# Patient Record
Sex: FEMALE | Race: WHITE | NOT HISPANIC OR LATINO | Employment: UNEMPLOYED | URBAN - METROPOLITAN AREA
[De-identification: names, ages, dates, MRNs, and addresses within clinical notes are randomized per-mention and may not be internally consistent; named-entity substitution may affect disease eponyms.]

---

## 2023-10-14 ENCOUNTER — APPOINTMENT (OUTPATIENT)
Dept: CT IMAGING | Facility: HOSPITAL | Age: 23
End: 2023-10-14
Payer: COMMERCIAL

## 2023-10-14 ENCOUNTER — HOSPITAL ENCOUNTER (EMERGENCY)
Facility: HOSPITAL | Age: 23
Discharge: HOME | End: 2023-10-14
Attending: EMERGENCY MEDICINE | Admitting: EMERGENCY MEDICINE
Payer: COMMERCIAL

## 2023-10-14 VITALS
SYSTOLIC BLOOD PRESSURE: 92 MMHG | HEIGHT: 64 IN | OXYGEN SATURATION: 99 % | WEIGHT: 130 LBS | HEART RATE: 62 BPM | BODY MASS INDEX: 22.2 KG/M2 | DIASTOLIC BLOOD PRESSURE: 51 MMHG | TEMPERATURE: 98.5 F | RESPIRATION RATE: 18 BRPM

## 2023-10-14 DIAGNOSIS — K52.9 GASTROENTERITIS: ICD-10-CM

## 2023-10-14 LAB
ALBUMIN SERPL BCG-MCNC: 4.7 G/DL (ref 3.5–5.2)
ALBUMIN UR-MCNC: 70 MG/DL
ALP SERPL-CCNC: 67 U/L (ref 35–104)
ALT SERPL W P-5'-P-CCNC: 11 U/L (ref 0–50)
ANION GAP SERPL CALCULATED.3IONS-SCNC: 11 MMOL/L (ref 7–15)
APPEARANCE UR: ABNORMAL
AST SERPL W P-5'-P-CCNC: 17 U/L (ref 0–45)
BASO+EOS+MONOS # BLD AUTO: NORMAL 10*3/UL
BASO+EOS+MONOS NFR BLD AUTO: NORMAL %
BASOPHILS # BLD AUTO: 0 10E3/UL (ref 0–0.2)
BASOPHILS NFR BLD AUTO: 1 %
BILIRUB DIRECT SERPL-MCNC: <0.2 MG/DL (ref 0–0.3)
BILIRUB SERPL-MCNC: 0.6 MG/DL
BILIRUB UR QL STRIP: NEGATIVE
BUN SERPL-MCNC: 8.1 MG/DL (ref 6–20)
CALCIUM SERPL-MCNC: 9.1 MG/DL (ref 8.6–10)
CHLORIDE SERPL-SCNC: 104 MMOL/L (ref 98–107)
COLOR UR AUTO: YELLOW
CREAT SERPL-MCNC: 0.72 MG/DL (ref 0.51–0.95)
DEPRECATED HCO3 PLAS-SCNC: 24 MMOL/L (ref 22–29)
EGFRCR SERPLBLD CKD-EPI 2021: >90 ML/MIN/1.73M2
EOSINOPHIL # BLD AUTO: 0.1 10E3/UL (ref 0–0.7)
EOSINOPHIL NFR BLD AUTO: 1 %
ERYTHROCYTE [DISTWIDTH] IN BLOOD BY AUTOMATED COUNT: 13 % (ref 10–15)
GLUCOSE SERPL-MCNC: 91 MG/DL (ref 70–99)
GLUCOSE UR STRIP-MCNC: NEGATIVE MG/DL
HCG UR QL: NEGATIVE
HCT VFR BLD AUTO: 39.8 % (ref 35–47)
HGB BLD-MCNC: 12.8 G/DL (ref 11.7–15.7)
HGB UR QL STRIP: ABNORMAL
IMM GRANULOCYTES # BLD: 0 10E3/UL
IMM GRANULOCYTES NFR BLD: 0 %
KETONES UR STRIP-MCNC: 20 MG/DL
LEUKOCYTE ESTERASE UR QL STRIP: NEGATIVE
LIPASE SERPL-CCNC: 42 U/L (ref 13–60)
LYMPHOCYTES # BLD AUTO: 1.4 10E3/UL (ref 0.8–5.3)
LYMPHOCYTES NFR BLD AUTO: 30 %
MCH RBC QN AUTO: 27.8 PG (ref 26.5–33)
MCHC RBC AUTO-ENTMCNC: 32.2 G/DL (ref 31.5–36.5)
MCV RBC AUTO: 87 FL (ref 78–100)
MONOCYTES # BLD AUTO: 0.4 10E3/UL (ref 0–1.3)
MONOCYTES NFR BLD AUTO: 7 %
MUCOUS THREADS #/AREA URNS LPF: PRESENT /LPF
NEUTROPHILS # BLD AUTO: 3 10E3/UL (ref 1.6–8.3)
NEUTROPHILS NFR BLD AUTO: 61 %
NITRATE UR QL: NEGATIVE
NRBC # BLD AUTO: 0 10E3/UL
NRBC BLD AUTO-RTO: 0 /100
PH UR STRIP: 6 [PH] (ref 5–7)
PLATELET # BLD AUTO: 219 10E3/UL (ref 150–450)
POTASSIUM SERPL-SCNC: 3.5 MMOL/L (ref 3.4–5.3)
PROT SERPL-MCNC: 7.4 G/DL (ref 6.4–8.3)
RBC # BLD AUTO: 4.6 10E6/UL (ref 3.8–5.2)
RBC URINE: 1 /HPF
SODIUM SERPL-SCNC: 139 MMOL/L (ref 135–145)
SP GR UR STRIP: 1.03 (ref 1–1.03)
SQUAMOUS EPITHELIAL: 5 /HPF
UROBILINOGEN UR STRIP-MCNC: 2 MG/DL
WBC # BLD AUTO: 4.9 10E3/UL (ref 4–11)
WBC URINE: 4 /HPF

## 2023-10-14 PROCEDURE — 99285 EMERGENCY DEPT VISIT HI MDM: CPT | Mod: 25

## 2023-10-14 PROCEDURE — 258N000003 HC RX IP 258 OP 636: Performed by: STUDENT IN AN ORGANIZED HEALTH CARE EDUCATION/TRAINING PROGRAM

## 2023-10-14 PROCEDURE — 82248 BILIRUBIN DIRECT: CPT

## 2023-10-14 PROCEDURE — 81001 URINALYSIS AUTO W/SCOPE: CPT

## 2023-10-14 PROCEDURE — 82310 ASSAY OF CALCIUM: CPT

## 2023-10-14 PROCEDURE — 83690 ASSAY OF LIPASE: CPT

## 2023-10-14 PROCEDURE — 250N000011 HC RX IP 250 OP 636: Mod: JZ

## 2023-10-14 PROCEDURE — 96361 HYDRATE IV INFUSION ADD-ON: CPT

## 2023-10-14 PROCEDURE — 96374 THER/PROPH/DIAG INJ IV PUSH: CPT | Mod: 59

## 2023-10-14 PROCEDURE — 85025 COMPLETE CBC W/AUTO DIFF WBC: CPT

## 2023-10-14 PROCEDURE — 36415 COLL VENOUS BLD VENIPUNCTURE: CPT

## 2023-10-14 PROCEDURE — 74177 CT ABD & PELVIS W/CONTRAST: CPT

## 2023-10-14 PROCEDURE — 81025 URINE PREGNANCY TEST: CPT

## 2023-10-14 RX ORDER — IOPAMIDOL 755 MG/ML
75 INJECTION, SOLUTION INTRAVASCULAR ONCE
Status: COMPLETED | OUTPATIENT
Start: 2023-10-14 | End: 2023-10-14

## 2023-10-14 RX ORDER — METOCLOPRAMIDE 10 MG/1
10 TABLET ORAL
Qty: 40 TABLET | Refills: 0 | Status: SHIPPED | OUTPATIENT
Start: 2023-10-14 | End: 2023-10-24

## 2023-10-14 RX ORDER — METOCLOPRAMIDE HYDROCHLORIDE 5 MG/ML
5 INJECTION INTRAMUSCULAR; INTRAVENOUS ONCE
Status: COMPLETED | OUTPATIENT
Start: 2023-10-14 | End: 2023-10-14

## 2023-10-14 RX ADMIN — SODIUM CHLORIDE 500 ML: 9 INJECTION, SOLUTION INTRAVENOUS at 09:44

## 2023-10-14 RX ADMIN — IOPAMIDOL 75 ML: 755 INJECTION, SOLUTION INTRAVENOUS at 12:48

## 2023-10-14 RX ADMIN — METOCLOPRAMIDE 5 MG: 5 INJECTION, SOLUTION INTRAMUSCULAR; INTRAVENOUS at 09:47

## 2023-10-14 ASSESSMENT — ENCOUNTER SYMPTOMS
VOMITING: 1
DIARRHEA: 1
HEMATURIA: 0
ABDOMINAL PAIN: 1
DYSURIA: 0
BACK PAIN: 0
NAUSEA: 1
SHORTNESS OF BREATH: 1

## 2023-10-14 ASSESSMENT — ACTIVITIES OF DAILY LIVING (ADL)
ADLS_ACUITY_SCORE: 35
ADLS_ACUITY_SCORE: 35

## 2023-10-14 NOTE — ED PROVIDER NOTES
EMERGENCY DEPARTMENT ENCOUNTER      NAME: Samy Leal  AGE: 23 year old female  YOB: 2000  MRN: [id number]  EVALUATION DATE & TIME: 10/14/2023  9:19 AM    PCP: No primary care provider on file.    ED PROVIDER: Shania Dent PA-C      Chief Complaint   Patient presents with    Vomiting     FINAL IMPRESSION:  1. Gastroenteritis      ED COURSE & MEDICAL DECISION MAKING:    Pertinent Labs & Imaging studies reviewed. (See chart for details)  23 year old female presents to the Emergency Department for evaluation of vomiting.  Earlier this morning while at work patient felt nauseous and dizzy.  Patient reports that she vomited 3 times.  She is not currently dizzy.  She denies any blood in the vomit.  She also reports having generalized abdominal pain only with vomiting.  She denies recent marijuana use.  Vital signs reviewed and unremarkable.  Afebrile.  On exam patient is minimally tender to palpation of the abdomen.  No specific region more tender than the other.  No rash.  Exam is otherwise unremarkable.    Differential diagnosis include appendicitis, cholecystitis, COVID, influenza, strep, viral illness, diverticulitis, small bowel obstruction. CBC shows no white blood cell elevation.  Basic was reassuring.  Lipase and hepatic were normal. UA shows no nitrates or leukocytes.  hCG was negative.  CT of the abdomen shows paucity of stool in the colon which contains air or liquid fecal contents.  Suggestive of gastroenteritis.  No signs of colitis or bowel obstruction..  Patient received fluids and Reglan in the emergency room.  Patient was able to sleep and reports that her symptoms did improve while in the emergency room.  Patient did not vomit while in the emergency room.    Educated on her imaging and lab results.  Patient was diagnosed with gastroenteritis.  Patient will return to the ED if new symptoms develop or symptoms worsen.  Patient will follow-up with her primary care doctor to discuss  her ED visit.  Patient return to the ED if new symptoms develop or symptoms worsen.  Patient was prescribed Reglan for home.  All questions answered.  Patient agrees with plan.      ED COURSE:   9:21 AM I saw the patient.   11:50 AM  Patient reports that she had fluids and Reglan which allowed her to sleep.  Patient is resting comfortably in the ED.  Patient has not vomited while in the emergency room.   :49 PM patient was educated on her imaging and lab results.  Patient be discharged home.  All questions answered.    At the conclusion of the encounter I discussed the results of all of the tests and the disposition. The questions were answered. The patient or family acknowledged understanding and was agreeable with the care plan.     0 minutes of critical care time       Additional Documentation    History:  Supplemental history from: Documented in chart, if applicable  External Record(s) reviewed: Documented in chart, if applicable.    Work Up:  Chart documentation includes differential considered and any EKGs or imaging interpreted by provider.  In additional to work up documented, I considered the following work up: Documented in chart, if applicable.    External consultation:  Discussion of management with another provider: Documented in chart, if applicable    Complicating factors:  Care impacted by chronic illness: N/A  Care affected by social determinants of health: N/A    Disposition considerations: Discharge. I prescribed additional prescription strength medication(s) as charted. See documentation for any additional details.      MEDICATIONS GIVEN IN THE EMERGENCY:  Medications   sodium chloride 0.9% BOLUS 500 mL (0 mLs Intravenous Stopped 10/14/23 7307)   metoclopramide (REGLAN) injection 5 mg (5 mg Intravenous $Given 10/14/23 5083)   iopamidol (ISOVUE-370) solution 75 mL (75 mLs Intravenous $Given 10/14/23 4732)       NEW PRESCRIPTIONS STARTED AT TODAY'S ER VISIT  Discharge Medication List as of  10/14/2023  1:53 PM        START taking these medications    Details   metoclopramide (REGLAN) 10 MG tablet Take 1 tablet (10 mg) by mouth 4 times daily (before meals and nightly) for 10 days, Disp-40 tablet, R-0, Local Print                =================================================================    HPI    Patient information was obtained from: patient     Use of : N/A         Samy Leal is a 23 year old female with a pertinent history of marijuana abuse (documented 2015) who presents to this ED by walk in for evaluation of vomiting.     Patient works in an assisted living. This morning, she was at work and started to feel nausea and dizzy. Dizziness has resolved. She reports having 3 episodes of vomiting. Emesis was nonbloody. Also endorses associated nonradiating abdominal pain while vomiting. She notes 1 episodes of loose stool.  No diarrhea. patient reports she has intermittent episodes of similar symptoms. Symptoms will resolve for long periods of time and then recur for a few days before resolving again. She was seen at urgent care for a episode of similar symptoms about 1 month ago. Patient says testing was negative. She was given Zofran, but it does not work for her. The patient denies chance of pregnancy. No known sick contacts. She denies marijuana use. No fevers, chills urinary concerns, back pain, or any other complaints at this time.      REVIEW OF SYSTEMS   Review of Systems   Respiratory:  Positive for shortness of breath.    Gastrointestinal:  Positive for abdominal pain, diarrhea, nausea and vomiting.   Genitourinary:  Negative for dysuria and hematuria.   Musculoskeletal:  Negative for back pain.   All other systems reviewed and are negative.       PAST MEDICAL HISTORY:  History reviewed. No pertinent past medical history.    PAST SURGICAL HISTORY:  History reviewed. No pertinent surgical history.        CURRENT MEDICATIONS:    metoclopramide (REGLAN) 10 MG  "tablet  cyclobenzaprine (FLEXERIL) 10 MG tablet  lidocaine (LIDODERM) 5 %        ALLERGIES:  No Known Allergies    FAMILY HISTORY:  History reviewed. No pertinent family history.    SOCIAL HISTORY:   Social History     Socioeconomic History    Marital status: Single   Tobacco Use    Smoking status: Passive Smoke Exposure - Never Smoker    Smokeless tobacco: Never    Tobacco comments:     Quit smoking: outside   Substance and Sexual Activity    Drug use: Unknown     Types: Other     Comment: Drug use: Not Asked   Social History Narrative    Preloaded 12/31/2012.       VITALS:  BP 92/51   Pulse 62   Temp 98.5  F (36.9  C)   Resp 18   Ht 1.626 m (5' 4\")   Wt 59 kg (130 lb)   SpO2 99%   BMI 22.31 kg/m      PHYSICAL EXAM    Physical Exam  Vitals and nursing note reviewed.   Constitutional:       General: She is not in acute distress.     Appearance: Normal appearance. She is not ill-appearing, toxic-appearing or diaphoretic.   HENT:      Head: Atraumatic.      Jaw: There is normal jaw occlusion. No trismus, tenderness, swelling, pain on movement or malocclusion.      Right Ear: Tympanic membrane, ear canal and external ear normal.      Left Ear: Tympanic membrane, ear canal and external ear normal.      Nose: Nose normal.      Right Sinus: No maxillary sinus tenderness or frontal sinus tenderness.      Left Sinus: No maxillary sinus tenderness or frontal sinus tenderness.      Mouth/Throat:      Mouth: Mucous membranes are moist.      Tongue: No lesions. Tongue does not deviate from midline.      Palate: No mass and lesions.      Pharynx: Oropharynx is clear. Uvula midline. No pharyngeal swelling, oropharyngeal exudate, posterior oropharyngeal erythema or uvula swelling.      Tonsils: No tonsillar exudate or tonsillar abscesses.   Eyes:      Conjunctiva/sclera: Conjunctivae normal.      Pupils: Pupils are equal, round, and reactive to light.   Neck:      Trachea: Trachea and phonation normal.   Cardiovascular:     "  Rate and Rhythm: Normal rate and regular rhythm.      Pulses: Normal pulses.      Heart sounds: Normal heart sounds. No murmur heard.     No friction rub. No gallop.   Pulmonary:      Effort: Pulmonary effort is normal.      Breath sounds: Normal breath sounds. No wheezing or rales.   Abdominal:      Tenderness: There is abdominal tenderness (generalized). There is no right CVA tenderness, left CVA tenderness, guarding or rebound.   Musculoskeletal:         General: Normal range of motion.      Cervical back: Full passive range of motion without pain and normal range of motion.   Lymphadenopathy:      Cervical: No cervical adenopathy.   Skin:     General: Skin is dry.   Neurological:      Mental Status: She is alert. Mental status is at baseline.   Psychiatric:         Mood and Affect: Mood normal.         Thought Content: Thought content normal.          LAB:  All pertinent labs reviewed and interpreted.  Labs Ordered and Resulted from Time of ED Arrival to Time of ED Departure   ROUTINE UA WITH MICROSCOPIC REFLEX TO CULTURE - Abnormal       Result Value    Color Urine Yellow      Appearance Urine Hazy (*)     Glucose Urine Negative      Bilirubin Urine Negative      Ketones Urine 20 (*)     Specific Gravity Urine 1.030      Blood Urine 0.2 mg/dL (*)     pH Urine 6.0      Protein Albumin Urine 70 (*)     Urobilinogen Urine 2.0 (*)     Nitrite Urine Negative      Leukocyte Esterase Urine Negative      Mucus Urine Present (*)     RBC Urine 1      WBC Urine 4      Squamous Epithelials Urine 5 (*)    BASIC METABOLIC PANEL - Normal    Sodium 139      Potassium 3.5      Chloride 104      Carbon Dioxide (CO2) 24      Anion Gap 11      Urea Nitrogen 8.1      Creatinine 0.72      GFR Estimate >90      Calcium 9.1      Glucose 91     LIPASE - Normal    Lipase 42     HEPATIC FUNCTION PANEL - Normal    Protein Total 7.4      Albumin 4.7      Bilirubin Total 0.6      Alkaline Phosphatase 67      AST 17      ALT 11       Bilirubin Direct <0.20     HCG QUALITATIVE URINE - Normal    hCG Urine Qualitative Negative     CBC WITH PLATELETS AND DIFFERENTIAL    WBC Count 4.9      RBC Count 4.60      Hemoglobin 12.8      Hematocrit 39.8      MCV 87      MCH 27.8      MCHC 32.2      RDW 13.0      Platelet Count 219      % Neutrophils 61      % Lymphocytes 30      % Monocytes 7      Mids % (Monos, Eos, Basos)        % Eosinophils 1      % Basophils 1      % Immature Granulocytes 0      NRBCs per 100 WBC 0      Absolute Neutrophils 3.0      Absolute Lymphocytes 1.4      Absolute Monocytes 0.4      Mids Abs (Monos, Eos, Basos)        Absolute Eosinophils 0.1      Absolute Basophils 0.0      Absolute Immature Granulocytes 0.0      Absolute NRBCs 0.0          RADIOLOGY:  Reviewed all pertinent imaging. Please see official radiology report.  CT Abdomen Pelvis w Contrast   Final Result   IMPRESSION:    1.  Paucity of stool in the colon which contains air or liquid fecal contents. Findings consistent with a gastroenteritis.    2.  No evidence of a colitis or bowel obstruction.           I, Shoshana Lawler, am serving as a scribe to document services personally performed by Shania Dent PA-C, based on my observation and the provider's statements to me. I, Shania Dent PA-C, attest that Shoshana Lawler is acting in a scribe capacity, has observed my performance of the services and has documented them in accordance with my direction.    Shania Dent PA-C  [facility] EMERGENCY DEPARTMENT  [address] [address]  [phone]       Shania Dent PA-C  10/14/23 5920

## 2023-10-14 NOTE — Clinical Note
Samy Leal was seen and treated in our emergency department on 10/14/2023.  She may return to work on 10/16/2023.       If you have any questions or concerns, please don't hesitate to call.      Shania Dent, TEJA

## 2023-10-14 NOTE — ED TRIAGE NOTES
"Pt reports vomiting x3 and diahea x 1 today, pt reports similar incident in past month,seen at urgent care/rx for Zofran, \"Zofran does not work.\" Pt denies any marijuana use.        "

## 2023-10-14 NOTE — ED PROVIDER NOTES
"Emergency Department Midlevel Supervisory Note     I personally saw the patient and performed a substantive portion of the visit including all aspects of the medical decision making.    ED Course:  1:40 PM Shania Dent PA-C staffed patient with me. I agree with their assessment and plan of management, and I will see the patient.  1:49 PM I met with the patient to introduce myself, gather additional history, perform my initial exam, and discuss the plan.     Brief HPI:     Samy Leal is a 23 year old female who presents for evaluation of vomiting.    I, Shoshana Lawler, am serving as a scribe to document services personally performed by Ivania Moon, based on my observations and the provider's statements to me.   I, Ivania Moon attest that Shoshana Lawler was acting in a scribe capacity, has observed my performance of the services and has documented them in accordance with my direction.    Brief Physical Exam: BP 92/51   Pulse 62   Temp 98.5  F (36.9  C)   Resp 18   Ht 1.626 m (5' 4\")   Wt 59 kg (130 lb)   SpO2 99%   BMI 22.31 kg/m    Constitutional:  Alert, in no acute distress  EYES: Conjunctivae clear  HENT:  Atraumatic, normocephalic  Respiratory:  Respirations even, unlabored, in no acute respiratory distress  Cardiovascular:  Regular rate and rhythm, good peripheral perfusion  GI: Soft, nondistended, nontender, no palpable masses, no rebound, no guarding   Musculoskeletal:  No edema. No cyanosis. Range of motion major extremities intact.    Integument: Warm, Dry, No erythema, No rash.   Neurologic:  Alert & oriented, no focal deficits noted  Psych: Normal mood and affect     MDM:  Pt with resolved nausea/vomiting/diarrhea without focal abdominal pain, CT with gastroenteritis without other acute pathology, patient ok with plan to discharge on antiemitic therapy with work note for time to recover today and tomorrow. Patient discharged after being provided with extensive anticipatory guidance and " given return precautions, importance of PMD follow-up emphasized.     1. Gastroenteritis        Labs and Imaging:  Results for orders placed or performed during the hospital encounter of 10/14/23   CT Abdomen Pelvis w Contrast    Impression    IMPRESSION:   1.  Paucity of stool in the colon which contains air or liquid fecal contents. Findings consistent with a gastroenteritis.   2.  No evidence of a colitis or bowel obstruction.     Basic metabolic panel   Result Value Ref Range    Sodium 139 135 - 145 mmol/L    Potassium 3.5 3.4 - 5.3 mmol/L    Chloride 104 98 - 107 mmol/L    Carbon Dioxide (CO2) 24 22 - 29 mmol/L    Anion Gap 11 7 - 15 mmol/L    Urea Nitrogen 8.1 6.0 - 20.0 mg/dL    Creatinine 0.72 0.51 - 0.95 mg/dL    GFR Estimate >90 >60 mL/min/1.73m2    Calcium 9.1 8.6 - 10.0 mg/dL    Glucose 91 70 - 99 mg/dL   Result Value Ref Range    Lipase 42 13 - 60 U/L   Hepatic function panel   Result Value Ref Range    Protein Total 7.4 6.4 - 8.3 g/dL    Albumin 4.7 3.5 - 5.2 g/dL    Bilirubin Total 0.6 <=1.2 mg/dL    Alkaline Phosphatase 67 35 - 104 U/L    AST 17 0 - 45 U/L    ALT 11 0 - 50 U/L    Bilirubin Direct <0.20 0.00 - 0.30 mg/dL   UA with Microscopic reflex to Culture    Specimen: Urine, Midstream   Result Value Ref Range    Color Urine Yellow Colorless, Straw, Light Yellow, Yellow    Appearance Urine Hazy (A) Clear    Glucose Urine Negative Negative mg/dL    Bilirubin Urine Negative Negative    Ketones Urine 20 (A) Negative mg/dL    Specific Gravity Urine 1.030 1.001 - 1.030    Blood Urine 0.2 mg/dL (A) Negative    pH Urine 6.0 5.0 - 7.0    Protein Albumin Urine 70 (A) Negative mg/dL    Urobilinogen Urine 2.0 (A) <2.0 mg/dL    Nitrite Urine Negative Negative    Leukocyte Esterase Urine Negative Negative    Mucus Urine Present (A) None Seen /LPF    RBC Urine 1 <=2 /HPF    WBC Urine 4 <=5 /HPF    Squamous Epithelials Urine 5 (H) <=1 /HPF   HCG qualitative urine   Result Value Ref Range    hCG Urine Qualitative  Negative Negative   CBC with platelets and differential   Result Value Ref Range    WBC Count 4.9 4.0 - 11.0 10e3/uL    RBC Count 4.60 3.80 - 5.20 10e6/uL    Hemoglobin 12.8 11.7 - 15.7 g/dL    Hematocrit 39.8 35.0 - 47.0 %    MCV 87 78 - 100 fL    MCH 27.8 26.5 - 33.0 pg    MCHC 32.2 31.5 - 36.5 g/dL    RDW 13.0 10.0 - 15.0 %    Platelet Count 219 150 - 450 10e3/uL    % Neutrophils 61 %    % Lymphocytes 30 %    % Monocytes 7 %    Mids % (Monos, Eos, Basos)      % Eosinophils 1 %    % Basophils 1 %    % Immature Granulocytes 0 %    NRBCs per 100 WBC 0 <1 /100    Absolute Neutrophils 3.0 1.6 - 8.3 10e3/uL    Absolute Lymphocytes 1.4 0.8 - 5.3 10e3/uL    Absolute Monocytes 0.4 0.0 - 1.3 10e3/uL    Mids Abs (Monos, Eos, Basos)      Absolute Eosinophils 0.1 0.0 - 0.7 10e3/uL    Absolute Basophils 0.0 0.0 - 0.2 10e3/uL    Absolute Immature Granulocytes 0.0 <=0.4 10e3/uL    Absolute NRBCs 0.0 10e3/uL     I have reviewed the relevant laboratory and radiology studies  Ivania Moon MD  [facility] EMERGENCY DEPARTMENT  [address] [place] [id number] [phone]       Ivania Moon MD  10/14/23 8789

## 2024-07-23 ENCOUNTER — HOSPITAL ENCOUNTER (EMERGENCY)
Facility: HOSPITAL | Age: 24
Discharge: HOME | End: 2024-07-23
Attending: EMERGENCY MEDICINE | Admitting: EMERGENCY MEDICINE
Payer: COMMERCIAL

## 2024-07-23 ENCOUNTER — APPOINTMENT (OUTPATIENT)
Dept: ULTRASOUND IMAGING | Facility: HOSPITAL | Age: 24
End: 2024-07-23
Payer: COMMERCIAL

## 2024-07-23 ENCOUNTER — NURSE TRIAGE (OUTPATIENT)
Dept: NURSING | Facility: CLINIC | Age: 24
End: 2024-07-23
Payer: COMMERCIAL

## 2024-07-23 VITALS
SYSTOLIC BLOOD PRESSURE: 105 MMHG | HEART RATE: 69 BPM | WEIGHT: 135 LBS | DIASTOLIC BLOOD PRESSURE: 63 MMHG | TEMPERATURE: 98.1 F | RESPIRATION RATE: 18 BRPM | OXYGEN SATURATION: 100 % | HEIGHT: 64 IN | BODY MASS INDEX: 23.05 KG/M2

## 2024-07-23 DIAGNOSIS — O20.9 VAGINAL BLEEDING IN PREGNANCY, FIRST TRIMESTER: ICD-10-CM

## 2024-07-23 LAB
ABO/RH(D): NORMAL
ANTIBODY SCREEN, TUBE: NORMAL
BASOPHILS # BLD AUTO: 0 10E3/UL (ref 0–0.2)
BASOPHILS NFR BLD AUTO: 1 %
EOSINOPHIL # BLD AUTO: 0.1 10E3/UL (ref 0–0.7)
EOSINOPHIL NFR BLD AUTO: 1 %
ERYTHROCYTE [DISTWIDTH] IN BLOOD BY AUTOMATED COUNT: 13.2 % (ref 10–15)
HCG INTACT+B SERPL-ACNC: ABNORMAL MIU/ML
HCT VFR BLD AUTO: 37.2 % (ref 35–47)
HGB BLD-MCNC: 12.1 G/DL (ref 11.7–15.7)
IMM GRANULOCYTES # BLD: 0 10E3/UL
IMM GRANULOCYTES NFR BLD: 0 %
LYMPHOCYTES # BLD AUTO: 1.5 10E3/UL (ref 0.8–5.3)
LYMPHOCYTES NFR BLD AUTO: 19 %
MCH RBC QN AUTO: 27.8 PG (ref 26.5–33)
MCHC RBC AUTO-ENTMCNC: 32.5 G/DL (ref 31.5–36.5)
MCV RBC AUTO: 86 FL (ref 78–100)
MONOCYTES # BLD AUTO: 0.5 10E3/UL (ref 0–1.3)
MONOCYTES NFR BLD AUTO: 7 %
NEUTROPHILS # BLD AUTO: 5.6 10E3/UL (ref 1.6–8.3)
NEUTROPHILS NFR BLD AUTO: 72 %
NRBC # BLD AUTO: 0 10E3/UL
NRBC BLD AUTO-RTO: 0 /100
PLATELET # BLD AUTO: 233 10E3/UL (ref 150–450)
RBC # BLD AUTO: 4.35 10E6/UL (ref 3.8–5.2)
SPECIMEN EXPIRATION DATE: NORMAL
SPECIMEN EXPIRATION DATE: NORMAL
WBC # BLD AUTO: 7.7 10E3/UL (ref 4–11)

## 2024-07-23 PROCEDURE — 86900 BLOOD TYPING SEROLOGIC ABO: CPT

## 2024-07-23 PROCEDURE — 84702 CHORIONIC GONADOTROPIN TEST: CPT

## 2024-07-23 PROCEDURE — 99284 EMERGENCY DEPT VISIT MOD MDM: CPT | Mod: 25

## 2024-07-23 PROCEDURE — 36415 COLL VENOUS BLD VENIPUNCTURE: CPT

## 2024-07-23 PROCEDURE — 86850 RBC ANTIBODY SCREEN: CPT

## 2024-07-23 PROCEDURE — 76801 OB US < 14 WKS SINGLE FETUS: CPT

## 2024-07-23 PROCEDURE — 85004 AUTOMATED DIFF WBC COUNT: CPT

## 2024-07-23 ASSESSMENT — ACTIVITIES OF DAILY LIVING (ADL)
ADLS_ACUITY_SCORE: 35

## 2024-07-23 ASSESSMENT — COLUMBIA-SUICIDE SEVERITY RATING SCALE - C-SSRS
1. IN THE PAST MONTH, HAVE YOU WISHED YOU WERE DEAD OR WISHED YOU COULD GO TO SLEEP AND NOT WAKE UP?: NO
2. HAVE YOU ACTUALLY HAD ANY THOUGHTS OF KILLING YOURSELF IN THE PAST MONTH?: NO
6. HAVE YOU EVER DONE ANYTHING, STARTED TO DO ANYTHING, OR PREPARED TO DO ANYTHING TO END YOUR LIFE?: NO

## 2024-07-23 NOTE — TELEPHONE ENCOUNTER
Patient calling reports just finding out she is pregnant with spotting and intermittent cramping. Reports being approximately 5-6 weeks pregnant with intermittent cramping over the past few days. Reports pain is mild. Patient also reports vaginal bleeding is mild. Denies shock, constant pain and confusion. Advised to see a provider within 24 hours with patient in the [facility] parking lot. Patient to go to the ER now.       Katharine Emerson RN 07/23/24 7:01 PM   [facility] Triage Nurse Advisor      Reason for Disposition   [1] Intermittent lower abdominal pain (e.g., cramping) AND [2] present > 24 hours    Additional Information   Negative: Shock suspected (e.g., cold/pale/clammy skin, too weak to stand, low BP, rapid pulse)   Negative: Difficult to awaken or acting confused (e.g., disoriented, slurred speech)   Negative: Passed out (i.e., lost consciousness, collapsed and was not responding)   Negative: Sounds like a life-threatening emergency to the triager   Negative: [1] Vaginal bleeding AND [2] pregnant 20 or more weeks   Negative: Not pregnant or pregnancy status unknown   Negative: SEVERE abdominal pain   Negative: SEVERE vaginal bleeding (e.g., soaking 2 pads per hour or large blood clots and present 2 or more hours)   Negative: SEVERE dizziness (e.g., unable to stand, requires support to walk, feels like passing out)   Negative: [1] MODERATE vaginal bleeding (e.g., soaking 1 pad per hour; clots) AND [2] present > 6 hours   Negative: [1] MODERATE vaginal bleeding (e.g., soaking 1 pad per hour; clots) AND [2] pregnant > 12 weeks   Negative: Passed tissue (e.g., gray-white)   Negative: Shoulder pain   Negative: Pale skin (pallor) of new-onset or worsening   Negative: Patient sounds very sick or weak to the triager   Negative: [1] Constant abdominal pain AND [2] present > 2 hours   Negative: Fever 100.4 F (38.0 C) or higher    Protocols used: Pregnancy - Vaginal Bleeding Less Than 20 Weeks [facility]-

## 2024-07-24 NOTE — ED PROVIDER NOTES
EMERGENCY DEPARTMENT ENCOUNTER      NAME: Samy Leal  AGE: 24 year old female  YOB: 2000  MRN: [id number]  EVALUATION DATE & TIME: 24 7:17 PM    PCP: No Ref-Primary, Physician    ED PROVIDER: Nohemi Red PA-C      CHIEF COMPLAINT:  Vaginal Bleeding - Pregnant      FINAL IMPRESSION:  1. Vaginal bleeding in pregnancy, first trimester          ED COURSE & MEDICAL DECISION MAKING:  Pertinent Labs & Imaging studies reviewed. (See chart for details)    ED Course as of 24 1432   Tue  The patient is a 24-year-old female G1, P0 at 6 weeks 1 day gestation by LMP 6/10/2024 with a history of PCOS presenting to the emergency department with her mother for evaluation of vaginal bleeding and abdominal cramping in pregnancy.  She has had abdominal cramping intermittently for the past week and did have some light pink vaginal spotting with wiping yesterday.  She had another episode of darker vaginal spotting with wiping today.  She does not feel that this is coming from her rectum or her urine.  No fevers, chills.  No change from baseline nausea and vomiting that she is experienced thus far in pregnancy.  She is not anticoagulated, no personal history of coagulation disorder.  She has not establish care with an OB/GYN group as of yet, does have an upcoming appointment with [place] [facility] on 2024.  She has not had an ultrasound thus far in pregnancy.   193 Initial vital signs reviewed and all within normal limits.  On exam, patient is clinically well-appearing and nontoxic-appearing in no acute distress.  Abdomen is soft, nondistended, no reproducible tenderness, no rebound or guarding.  Bowel sounds present.    I considered a broad differential including vaginal bleeding in pregnancy, threatened , spontaneous , missed , ectopic pregnancy, placenta previa, placental abruption. Discussed options for workup and management with patient  and agreed on plan for labs and US.   2105 hCG Quantitative(!): 33,840   2122 Hemoglobin: 12.1     Ultrasound showed Intrauterine pregnancy of uncertain viability, with an estimated gestational age of 6 weeks 1 day by mean sac diameter, no embryo seen but possibly too early to see. Overall, patient history, exam, and workup most consistent with vaginal bleeding in pregnancy.     Discussed plan for discharge to home with close outpatient follow up with [facility] which is her OB group. The patient verbalized understanding and is comfortable with this plan. Symptomatic home cares discussed. Emphasized importance of follow up with primary care clinician. Strict return precautions discussed.     At the conclusion of the encounter I discussed the results of all of the tests and the disposition. The questions were answered. The patient or family acknowledged understanding and was agreeable with the care plan.       ED COURSE:  7:17 PM I met and introduced myself to the patient. I gathered initial history and performed an initial physical exam. We discussed options and plan for diagnostics and treatment here in the ED.  7:56 PM I have staffed the patient with Dr. Moe, ED physician, who has evaluated the patient and agrees with all aspects of today's care.   10:29 PM I rechecked on the patient and updated them on lab results and the plan. I discussed the plan for discharge with the patient, and patient is agreeable. We discussed supportive cares at home and reasons for return to the ER including new or worsening symptoms - all questions and concerns addressed to the best of my ability. Strict return precautions discussed. Patient to be discharged by RN.      Medical Decision Making  Obtained supplemental history:Supplemental history obtained?: Documented in chart  Reviewed external records: External records reviewed?: Documented in chart  Care impacted by chronic illness:Other: PCOS  Care significantly  affected by social determinants of health:Access to Medical Care  Did you consider but not order tests?: Work up considered but not performed and documented in chart, if applicable  Did you interpret images independently?: Independent interpretation of ECG and images noted in documentation, when applicable.  Consultation discussion with other provider:Did you involve another provider (consultant, , pharmacy, etc.)?: No  Discharge. No recommendations on prescription strength medication(s). See documentation for any additional details.      CRITICAL CARE:  Not applicable      MEDICATIONS GIVEN IN THE EMERGENCY:  Medications - No data to display    NEW PRESCRIPTIONS STARTED AT TODAY'S ER VISIT  Discharge Medication List as of 7/23/2024 10:36 PM             =================================================================    HPI    Patient information was obtained from: patient, mother    Use of Intrepreter: N/A       Samy Leal is a 24 year old female with pertinent medical history of PCOS who presents to the emergency department for evaluation of vaginal bleeding.     For the past week, the patient has had intermittent abdominal cramping. Yesterday, the patient had 1 episode of light pink vaginal bleeding when she wiped. Today, the patient had one episode of vaginal bleeding that was darker in color. She is on her first pregnancy and the first day of her last menstrual cycle was on 6/10/24.  He has not had an ultrasound thus far in her pregnancy.  She plans to see [place] [facility] for OB care, has upcoming appointment with them scheduled for 8/19/2024.    Denies loss of consciousness, hematuria, bloody stool, black stool, fever, history of bleeding disorder, or any other complaints at this time.    Per chart review,  7/16/24 The patient visited the UR for vaginal discharge. Urine pregnancy test came back positive and UA came back normal Gonorrhea and chlamydia tests are negative. Treatment with the  "clindamycin vaginal gel rather than oral metronidazole given her pregnant state.     6/26/24 The patient visited the UR for vaginal irritation. UA and UPT were negative. GC chlamydia was negative. Wet prep showed clue cells consistent with BV. Prescribed Flagyl 500 mg.       PAST MEDICAL HISTORY:  No past medical history on file.    PAST SURGICAL HISTORY:  No past surgical history on file.    CURRENT MEDICATIONS:    Prior to Admission Medications   Prescriptions Last Dose Informant Patient Reported? Taking?   cyclobenzaprine (FLEXERIL) 10 MG tablet   No No   Sig: [CYCLOBENZAPRINE (FLEXERIL) 10 MG TABLET] Take 1 tablet (10 mg total) by mouth 2 (two) times a day as needed for muscle spasms.   lidocaine (LIDODERM) 5 %   No No   Sig: [LIDOCAINE (LIDODERM) 5 %] Remove & Discard patch within 12 hours or as directed by MD      Facility-Administered Medications: None       ALLERGIES:  No Known Allergies    FAMILY HISTORY:  No family history on file.    SOCIAL HISTORY:  Social History     Tobacco Use    Smoking status: Passive Smoke Exposure - Never Smoker    Smokeless tobacco: Never    Tobacco comments:     Quit smoking: outside   Substance Use Topics    Drug use: Unknown     Types: Other     Comment: Drug use: Not Asked        VITALS:    First Vitals:  No data found.      No data found.        PHYSICAL EXAM  VITAL SIGNS: /63   Pulse 69   Temp 98.1  F (36.7  C) (Oral)   Resp 18   Ht 1.626 m (5' 4\")   Wt 61.2 kg (135 lb)   LMP 06/10/2024 (Approximate)   SpO2 100%   BMI 23.17 kg/m     GENERAL: Awake, alert, answering questions appropriately.  HEENT: Moist mucous membranes. Posterior oropharynx clear with no erythema, no exudate. No tonsillar hypertrophy. Uvula midline. Tolerating secretions, no drooling.    SPEECH:  Easy to understand speech, Normal volume and dalia. Normal phonation.  PULMONARY: No respiratory distress, Breathing comfortably on room air. Lungs clear to auscultation " bilaterally.  CARDIOVASCULAR: Regular rate and rhythm, radial pulses present, symmetric, and normal.  ABDOMINAL: Soft, Nondistended, Nontender, No rebound or guarding, No palpable masses. Bowel sounds present.  BACK: No CVA tenderness.  EXTREMITIES: Extremities are warm and well perfused. No lower extremity edema.  NEUROLOGIC: Moving all extremities spontaneously.   SKIN: Exposed areas of skin warm, dry, no rashes.  PSYCH: Normal mood and affect.       RADIOLOGY/LAB:  Reviewed all pertinent imaging. Please see official radiology report.  All pertinent labs reviewed and interpreted.  Results for orders placed or performed during the hospital encounter of 07/23/24   US OB <14 Weeks W Transvaginal    Impression    IMPRESSION:     1.  Intrauterine pregnancy of uncertain viability, with an estimated gestational age of 6 weeks 1 day by mean sac diameter. No embryo is visualized, which may be related to the early age of the gestation. Correlate with beta hCG values and recommend a   follow-up exam in a minimum of 11 days to reevaluate for viability.   HCG quantitative pregnancy   Result Value Ref Range    hCG Quantitative 33,840 (H) <5 mIU/mL   CBC with platelets and differential   Result Value Ref Range    WBC Count 7.7 4.0 - 11.0 10e3/uL    RBC Count 4.35 3.80 - 5.20 10e6/uL    Hemoglobin 12.1 11.7 - 15.7 g/dL    Hematocrit 37.2 35.0 - 47.0 %    MCV 86 78 - 100 fL    MCH 27.8 26.5 - 33.0 pg    MCHC 32.5 31.5 - 36.5 g/dL    RDW 13.2 10.0 - 15.0 %    Platelet Count 233 150 - 450 10e3/uL    % Neutrophils 72 %    % Lymphocytes 19 %    % Monocytes 7 %    % Eosinophils 1 %    % Basophils 1 %    % Immature Granulocytes 0 %    NRBCs per 100 WBC 0 <1 /100    Absolute Neutrophils 5.6 1.6 - 8.3 10e3/uL    Absolute Lymphocytes 1.5 0.8 - 5.3 10e3/uL    Absolute Monocytes 0.5 0.0 - 1.3 10e3/uL    Absolute Eosinophils 0.1 0.0 - 0.7 10e3/uL    Absolute Basophils 0.0 0.0 - 0.2 10e3/uL    Absolute Immature Granulocytes 0.0 <=0.4 10e3/uL     Absolute NRBCs 0.0 10e3/uL   Adult Type and Screen   Result Value Ref Range    ABO/RH(D) O NEG     SPECIMEN EXPIRATION DATE [id number]    Antibody Screen (Reflex)   Result Value Ref Range    ANTIBODY SCREEN, TUBE NEG     SPECIMEN EXPIRATION DATE [id number]          EKG:  None      PROCEDURES:  None        I, Peterson Mera, am serving as a scribe to document services personally performed by Nohemi Red PA-C, based on my observation and the provider's statements to me. I, Nohemi Red PA-C attest that Peterson Mera is acting in a scribe capacity, has observed my performance of the services and has documented them in accordance with my direction.         Nohemi Red PA-C  Emergency Medicine  [facility] EMERGENCY DEPARTMENT  [address] [place] [id number]  [phone]  Dept: [phone]     Nohemi Red PA-C  08/02/24 4754

## 2024-07-24 NOTE — ED PROVIDER NOTES
Emergency Department Staff Note  I had a face to face encounter with this patient seen by the Advanced Practice Provider (ZENAIDA).  I personally made/approved the management plan and take responsibility for the patient management. I personally saw the patient and performed a substantive portion of the visit including all aspects of the medical decision making.    Patient is a 24-year-old female comes in today for evaluation of some slight vaginal bleeding that started yesterday.  It was a little bit darker today and then when she went to the bathroom here in the emergency department she had no bleeding.  She has no tenderness on my exam.  She has not had previous pregnancy and has not had an ultrasound during this pregnancy.  She thinks she is just over 6 weeks along.  On my exam she had no tenderness.  We got basic labs and ultrasound imaging to further evaluate.  Quant was elevated to 33,000 and IUP was seen but it did not show an embryo.  It is possible that it is too early to see.  Patient will need to follow-up closely with OB as an outpatient.  She is planning to follow with [place] [facility] EMERGENCY DEPARTMENT  MD Payal Sheridan Caroline Nicole, MD  07/23/24 [id number]

## 2024-07-24 NOTE — ED TRIAGE NOTES
Patient presents to ER with mom.  States she's about 5-6 weeks pregnant and noticed some light vaginal bleeding yesterday and since then has gradually gotten darker.  No visible clots.  Not wearing a pad. Endorses some abdominal cramping.

## 2024-07-24 NOTE — DISCHARGE INSTRUCTIONS
Today, you were seen in the emergency department for vaginal bleeding in pregnancy. Based on your evaluation and work up, your symptoms were not found to be caused by an emergency at this time.   Your blood work confirmed that your are pregnant, your hCG level is 33,840 mIU/ml.    Although your evaluation today did not show an emergency today, please schedule an appointment with obstetrics/gynecology within 48 hours for a more comprehensive evaluation. Given your symptoms, it is important you follow up to have your blood drawn again to measure your pregnancy hormone level. This will help us determine whether your pregnancy is progressing normally or if there are problems.      Call 911 anytime you think you may need emergency care. For example, call if:    You passed out (lost consciousness).     You have severe vaginal bleeding. This means you are soaking through a pad each hour for 2 or more hours in a row.     You have sudden, severe pain in your belly or pelvis.   Call your doctor now or seek immediate medical care if:    You have new or worse vaginal bleeding.     You are dizzy or lightheaded, or you feel like you may faint.     You have pain in your belly, pelvis, or lower back.     You think that you are in labor.     You have a sudden release of fluid from your vagina.     You've been having regular contractions for an hour. This means that you've had at least 8 contractions within 1 hour or at least 4 contractions within 20 minutes, even after you change your position and drink fluids.     You notice that your baby has stopped moving or is moving much less than normal.   Watch closely for changes in your health, and be sure to contact your doctor if you have any problems.

## 2024-10-31 ENCOUNTER — TRANSFERRED RECORDS (OUTPATIENT)
Dept: HEALTH INFORMATION MANAGEMENT | Facility: CLINIC | Age: 24
End: 2024-10-31
Payer: COMMERCIAL

## 2024-10-31 ENCOUNTER — TRANSCRIBE ORDERS (OUTPATIENT)
Dept: MATERNAL FETAL MEDICINE | Facility: HOSPITAL | Age: 24
End: 2024-10-31
Payer: COMMERCIAL

## 2024-10-31 ENCOUNTER — MEDICAL CORRESPONDENCE (OUTPATIENT)
Dept: HEALTH INFORMATION MANAGEMENT | Facility: CLINIC | Age: 24
End: 2024-10-31
Payer: COMMERCIAL

## 2024-10-31 DIAGNOSIS — O26.90 PREGNANCY RELATED CONDITION, ANTEPARTUM: Primary | ICD-10-CM

## 2024-11-06 ENCOUNTER — PRE VISIT (OUTPATIENT)
Dept: MATERNAL FETAL MEDICINE | Facility: CLINIC | Age: 24
End: 2024-11-06
Payer: COMMERCIAL

## 2024-11-06 ENCOUNTER — HOSPITAL ENCOUNTER (OUTPATIENT)
Facility: HOSPITAL | Age: 24
Discharge: HOME | End: 2024-11-06
Attending: OBSTETRICS & GYNECOLOGY
Payer: COMMERCIAL

## 2024-11-06 ENCOUNTER — APPOINTMENT (OUTPATIENT)
Dept: ULTRASOUND IMAGING | Facility: HOSPITAL | Age: 24
End: 2024-11-06
Payer: COMMERCIAL

## 2024-11-06 ENCOUNTER — HOSPITAL ENCOUNTER (OUTPATIENT)
Facility: HOSPITAL | Age: 24
End: 2024-11-06
Payer: COMMERCIAL

## 2024-11-06 VITALS — DIASTOLIC BLOOD PRESSURE: 65 MMHG | SYSTOLIC BLOOD PRESSURE: 111 MMHG

## 2024-11-06 PROBLEM — Z36.89 ENCOUNTER FOR TRIAGE IN PREGNANT PATIENT: Status: ACTIVE | Noted: 2024-11-06

## 2024-11-06 LAB
ABO/RH(D): NORMAL
ANTIBODY SCREEN, TUBE: NORMAL
APTT PPP: 22 SECONDS (ref 22–38)
ERYTHROCYTE [DISTWIDTH] IN BLOOD BY AUTOMATED COUNT: 13.7 % (ref 10–15)
FETAL RBC % LFV: 0 %
FETAL RBC (ML): 0 ML
FIBRINOGEN PPP-MCNC: 508 MG/DL (ref 170–510)
HCT VFR BLD AUTO: 35 % (ref 35–47)
HGB BLD-MCNC: 11.1 G/DL (ref 11.7–15.7)
HOLD SPECIMEN: NORMAL
HOLD SPECIMEN: NORMAL
IF INDICATED RECOMMENDED DOSE OF RH IMMUNE GLOBULIN UG: 300 UG
INR PPP: 0.96 (ref 0.85–1.15)
MCH RBC QN AUTO: 27.9 PG (ref 26.5–33)
MCHC RBC AUTO-ENTMCNC: 31.7 G/DL (ref 31.5–36.5)
MCV RBC AUTO: 88 FL (ref 78–100)
PLATELET # BLD AUTO: 228 10E3/UL (ref 150–450)
RBC # BLD AUTO: 3.98 10E6/UL (ref 3.8–5.2)
SPECIMEN EXPIRATION DATE: NORMAL
SPECIMEN EXPIRATION DATE: NORMAL
WBC # BLD AUTO: 11.3 10E3/UL (ref 4–11)

## 2024-11-06 PROCEDURE — 85460 HEMOGLOBIN FETAL: CPT

## 2024-11-06 PROCEDURE — 36415 COLL VENOUS BLD VENIPUNCTURE: CPT

## 2024-11-06 PROCEDURE — 96372 THER/PROPH/DIAG INJ SC/IM: CPT

## 2024-11-06 PROCEDURE — 86850 RBC ANTIBODY SCREEN: CPT

## 2024-11-06 PROCEDURE — 86900 BLOOD TYPING SEROLOGIC ABO: CPT

## 2024-11-06 PROCEDURE — 85014 HEMATOCRIT: CPT

## 2024-11-06 PROCEDURE — 86901 BLOOD TYPING SEROLOGIC RH(D): CPT

## 2024-11-06 PROCEDURE — G0463 HOSPITAL OUTPT CLINIC VISIT: HCPCS

## 2024-11-06 PROCEDURE — 250N000011 HC RX IP 250 OP 636

## 2024-11-06 PROCEDURE — 85384 FIBRINOGEN ACTIVITY: CPT

## 2024-11-06 PROCEDURE — 76815 OB US LIMITED FETUS(S): CPT

## 2024-11-06 PROCEDURE — 85610 PROTHROMBIN TIME: CPT

## 2024-11-06 PROCEDURE — 85730 THROMBOPLASTIN TIME PARTIAL: CPT

## 2024-11-06 RX ORDER — LIDOCAINE 40 MG/G
CREAM TOPICAL
Status: DISCONTINUED | OUTPATIENT
Start: 2024-11-06 | End: 2024-11-06 | Stop reason: HOSPADM

## 2024-11-06 RX ADMIN — HUMAN RHO(D) IMMUNE GLOBULIN 300 MCG: 1500 SOLUTION INTRAMUSCULAR; INTRAVENOUS at 16:18

## 2024-11-06 ASSESSMENT — ACTIVITIES OF DAILY LIVING (ADL)
ADLS_ACUITY_SCORE: 0
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
## 2024-11-07 NOTE — PROGRESS NOTES
Reviewed discharge instructions with pt.  She has an MFM appointment on Tuesday of next week.  She verbalized understanding and discharged home.

## 2024-11-07 NOTE — PROGRESS NOTES
Abdomen remains soft, non tender to palpation.  FHR doppler checks done q 30 min.  Ultrasound results  called to Marta Reeves CNM. Rhophylac given IM.   Discharge orders rec'cd.  Pt to follow up in clinic later this week or early next.

## 2024-11-12 ENCOUNTER — OFFICE VISIT (OUTPATIENT)
Dept: MATERNAL FETAL MEDICINE | Facility: CLINIC | Age: 24
End: 2024-11-12
Attending: OBSTETRICS & GYNECOLOGY
Payer: COMMERCIAL

## 2024-11-12 ENCOUNTER — HOSPITAL ENCOUNTER (OUTPATIENT)
Dept: ULTRASOUND IMAGING | Facility: CLINIC | Age: 24
Discharge: HOME | End: 2024-11-12
Attending: OBSTETRICS & GYNECOLOGY
Payer: COMMERCIAL

## 2024-11-12 DIAGNOSIS — O26.90 PREGNANCY RELATED CONDITION, ANTEPARTUM: ICD-10-CM

## 2024-11-12 DIAGNOSIS — O28.3 ABNORMAL FETAL ULTRASOUND: Primary | ICD-10-CM

## 2024-11-12 DIAGNOSIS — O35.EXX0 PYELECTASIS OF FETUS ON PRENATAL ULTRASOUND: Primary | ICD-10-CM

## 2024-11-12 PROCEDURE — 76811 OB US DETAILED SNGL FETUS: CPT

## 2024-11-12 PROCEDURE — 96040 HC GENETIC COUNSELING, EACH 30 MINUTES: CPT

## 2024-11-12 NOTE — PROGRESS NOTES
[facility]  Genetic Counseling Consult    Patient:  Samy Leal YOB: 2000   Date of Service:  24   MRN: [id number]    Samy was seen at the [facility] [place] for genetic consultation. The indication for genetic counseling is abnormal fetal ultrasound at outside clinic. The patient was accompanied to this visit by their mother, Susan.     IMPRESSION/ PLAN   1. Samy had genetic screening earlier in this pregnancy. Their non-invasive prenatal test was screen negative or low risk for screened conditions     2. During today's [facility] visit, Samy had a genetic counseling session only. Screening and diagnostic testing was discussed and declined.     3. Samy had a level II comprehensive anatomy ultrasound today. Please see the ultrasound report for further details.    4. Further recommendation include a follow-up ultrasound with [facility]. The upcoming ultrasound has been scheduled for 2025.    PREGNANCY HISTORY   /Parity:       This is Samy's first pregnancy.    CURRENT PREGNANCY   Current Age: 24 year old     Age at Delivery: 24 year old    HOLGER: 3/18/2025, by Last Menstrual Period                                     Gestational Age: 22w0d    This pregnancy is a single gestation.     This pregnancy was conceived spontaneously.    Samy reports no bleeding, complications, illnesses, fever or exposure concerns with this pregnancy.     We reviewed that the outside ultrasound saw urinary tract dilation and echogenic intracardiac focus.    Urinary tract dilation, or pyelectasis, is seen in 1-2% of fetuses in the second trimester. This is most commonly a transient finding that resolves on subsequent scans, but may indicate impending kidney disease in about 15% of cases. The cutoff for mild urinary tract dilation is 4 mm in the second trimester. We reviewed that pyelectasis can be seen more frequently  "in the setting of an underlying chromosome problem (particularly Down syndrome), as compared to pregnancies that do not have a chromosome problem.Typically pyelectasis resolves later in pregnancy and requires no further medical attention.  However, occassionally additional fluid can accumulate in the kidneys over the course of pregnancy if there is a blockage or other abnormality of the ureter or kidney; if that happens, this can sometimes require medical attention after birth.  Because of this, usually another ultrasound is recommended for later in pregnancy to again assess the fetal kidneys typically around 32 weeks gestation.      An echogenic intracardiac focus (EIF), which refers to a small bright spot in the heart.  This finding is generally not associated with heart defects or other heart problems.  Some studies have suggested that this ultrasound finding is thought to be seen with a higher frequency in pregnancies with a chromosome problem (particularly Down syndrome), as compared to pregnancies that do not have a chromosome problem. Because of this, it is thought that this ultrasound finding may increase the risk of a chromosome problem in a pregnancy.     Reviewed that in the setting of a low risk NIPT, UTD and EIF do not increase the risk for Down syndrome.     MEDICAL HISTORY   Samy colvin reported medical history is not expected to impact pregnancy management or risks to fetal development.     FAMILY HISTORY   A three-generation pedigree was obtained today and is scanned under the \"Media\" tab in Epic. The family history was reported by Samy and their mother.    The following significant findings were reported today:   Samy has seven siblings. One brother has a speech delay and other brother has developmental delays and mild intellectual disability. Susan reports that neither of her sons have seen genetics.   Reviewed that about 25-50% of the time global developmental delay and intellectual " disabilities may have an underlying genetic cause such as Fragile X syndrome.   Maternal cousin with autism  Autism spectrum disorders (ASD) are characterized by abnormalities in language and social cognition. Individuals with ASD typically have difficulty with communication and interaction with others, intellectual disability, restricted interests, and repetitive behaviors. The cause of ASD is currently unknown. In less than 1% of all individuals with ASD, it is a feature of a certain genetic condition such as Down syndrome, fragile X syndrome, or Rett syndrome. However, in most cases the cause may be multifactorial which means a combination of genetic and environmental factors. The genetic causes or predispositions are being researched and many have been suggested. If an individual is affected with ASD there is an estimated 2-18.7% chance for a sibling of the individual to also be affected. Estimates for second degree relatives are currently unknown but could be elevated from the general population risk.   Mother has fibromyalgia. Reviewed that this is often multifactorial and can run in families.   Encouraged Samy to share her family history with her primary care provider.      Otherwise, the reported family history is unremarkable for multiple miscarriages, stillbirths, birth defects, known genetic conditions, and consanguinity.       RISK ASSESSMENT FOR CHROMOSOME CONDITIONS   We explained that the risk for fetal chromosome abnormalities increases with maternal age. We discussed specific features of common chromosome abnormalities, including Down syndrome, trisomy 13, trisomy 18, and sex chromosome trisomies.    At age 24 at midtrimester, the risk to have a baby with Down syndrome is 1 in 1087.  At age 24 at midtrimester, the risk to have a baby with any chromosome abnormality is 1 in 544.     Samy had genetic screening earlier in this pregnancy. Their non-invasive prenatal test was screen negative or low  risk for screened conditions     Non-invasive prenatal testing (NIPT) results  Maternal plasma cell-free DNA testing  Screens for fetal trisomy 21, trisomy 13, trisomy 18, and sex chromosome aneuploidy  Samy had NIPT earlier in pregnancy; we reviewed the results today, which are low risk.  The NIPT did include sex chromosome aneuploidies and the result was low risk. The predicted sex is XY, which is typically male.  Given the accuracy of this test, these results greatly decrease the chance for certain fetal chromosome abnormalities  We discussed the limitations of normal NIPT results      RISK ASSESSMENT FOR INHERITED CONDITIONS   We discussed that every pregnancy has a chance to have an inherited single-gene condition, even when there is no family history of that condition. In fact, approximately 90% of couples at an increased reproductive risk for an inherited condition have no family history of that condition. The average person may be a carrier for 5-10 different genetic variants that can increase the chance for their pregnancies to have that condition. We discussed autosomal recessive conditions and X-linked conditions. Autosomal recessive conditions happen when a mutation has been inherited from the egg and sperm and include conditions like cystic fibrosis, thalassemia, hearing loss, spinal muscular atrophy, and more. X-linked conditions happen when a mutation has been inherited from the egg and include conditions like fragile X syndrome.     We reviewed that when both biological parents carry a harmful genetic change in a gene associated with autosomal recessive inheritance, each of their pregnancies has a 1 in 4 (25%) chance to be affected by that condition. With x-linked conditions, the specific risk generally depends on the chromosomal sex of the fetus, with XY individuals (generally male) being most severely affected.     [facility] screening was reviewed. About [place]  Scre[place]     The patient has  not had carrier screening previously.  The patient declined the carrier screening options. They are aware the option will remain, and they can contact us if they would like to pursue screening. See below for the more detailed information we dicussed.    We discussed that expanded carrier screening is designed to identify carrier status for conditions that are primarily childhood or adolescent onset. Expanded carrier screening does not evaluate for adult-onset conditions such as hereditary cancer syndromes, dementia/ Alzheimer's disease, or cardiovascular disease risk factors. Additionally, expanded carrier screening is not comprehensive for all known genetic diseases or inherited conditions. It will not intentionally screen for autosomal dominant conditions. This is a screening test, and residual carrier status risk figures will be provided to the patient after results become available. Carrier screening is not meant to diagnose the patient with a condition, and generally carriers are asymptomatic. However, certain genes may confer increased risks for various health concerns in carriers (including, but not limited to: BARRY, DMD, FMR1).    GENETIC TESTING OPTIONS   Genetic testing during a pregnancy includes screening and diagnostic procedures.      Screening tests are non-invasive which means no risk to the pregnancy and includes ultrasounds and blood work. The benefits and limitations of screening were reviewed. Screening tests provide a risk assessment (chance) specific to the pregnancy for certain fetal chromosome abnormalities but cannot definitively diagnose or exclude a fetal chromosome abnormality. Follow-up genetic counseling and consideration of diagnostic testing is recommended with any abnormal screening result. Diagnostic testing during a pregnancy is more certain and can test for more conditions. However, the tests do have a risk of miscarriage that requires careful consideration. These tests can detect  fetal chromosome abnormalities with greater than 99% certainty. Results can be compromised by maternal cell contamination or mosaicism and are limited by the resolution of current genetic testing technology.     There is no screening or diagnostic test that detects all forms of birth defects or intellectual disability.     We discussed the following screening options:   Carrier screening  Risk assessment for certain autosomal recessive and x-linked conditions. These conditions are generally infantile- or childhood-onset conditions.   Can be done any time during the pregnancy or prior to pregnancy.  Can screen for over 500 different genetic conditions.  Is not intended to diagnosis a condition in the carrier parent.    Even with negative results, a residual risk for screened conditions remains.      We discussed the following ultrasound options:  Comprehensive level II ultrasound (Fetal Anatomy Ultrasound)  Ultrasound done between 18-20 weeks gestation  Screens for major birth defects and markers for aneuploidy (like trisomy 21 and trisomy 18)  Includes looking at the fetus/baby's growth, heart, organs (stomach, kidneys), placenta, and amniotic fluid  Fetal Echocardiogram  Ultrasound done between 22-24 weeks gestation  Screen for heart defects  Recommended if there are concerns about the heart or other indications like IVF pregnancy or maternal diabetes    We discussed the following diagnostic options:   Amniocentesis  Invasive diagnostic procedure done after 15 weeks gestation  The procedure collects a small sample of amniotic fluid for the purpose of chromosomal testing and/or other genetic testing  Diagnostic result; more than 99% sensitivity for fetal chromosome abnormalities  Testing for AFP in the amniotic fluid can test for open neural tube defects      It was a pleasure to be involved with Samy [facility]. Face-to-face time of the meeting was 30 minutes.    AURA MASTERS MS, [facility]  Genetic Counselor  [place]  [facility]  Maternal Fetal Medicine  Office: [phone]   [facility]: [phone]   Fax: [phone]  [facility]

## 2024-11-13 NOTE — PROGRESS NOTES
"Please see \"Imaging\" tab under \"Chart Review\" for details of today's visit.    Shira Eller    "

## 2024-12-10 ENCOUNTER — HOSPITAL ENCOUNTER (EMERGENCY)
Facility: HOSPITAL | Age: 24
Discharge: HOME | End: 2024-12-10
Attending: EMERGENCY MEDICINE | Admitting: EMERGENCY MEDICINE
Payer: COMMERCIAL

## 2024-12-10 VITALS
WEIGHT: 148 LBS | RESPIRATION RATE: 16 BRPM | HEART RATE: 114 BPM | TEMPERATURE: 97.5 F | BODY MASS INDEX: 26.22 KG/M2 | OXYGEN SATURATION: 100 % | HEIGHT: 63 IN | SYSTOLIC BLOOD PRESSURE: 117 MMHG | DIASTOLIC BLOOD PRESSURE: 66 MMHG

## 2024-12-10 DIAGNOSIS — G51.0 BELL'S PALSY: ICD-10-CM

## 2024-12-10 PROCEDURE — 99283 EMERGENCY DEPT VISIT LOW MDM: CPT

## 2024-12-10 RX ORDER — VALACYCLOVIR HYDROCHLORIDE 1 G/1
1000 TABLET, FILM COATED ORAL 3 TIMES DAILY
Qty: 21 TABLET | Refills: 0 | Status: SHIPPED | OUTPATIENT
Start: 2024-12-10 | End: 2024-12-17

## 2024-12-10 NOTE — ED PROVIDER NOTES
EMERGENCY DEPARTMENT ENCOUnter      NAME: Samy Leal  AGE: 24 year old female  YOB: 2000  MRN: [id number]  EVALUATION DATE & TIME: No admission date for patient encounter.    PCP: No Ref-Primary, Physician    ED PROVIDER: Romero Jones DO      Chief Complaint   Patient presents with    Facial Paralysis         FINAL IMPRESSION:  1. Bell's palsy          ED COURSE & MEDICAL DECISION MAKING:    The patient presented to the emergency department today with complaints of right-sided facial weakness.  Symptoms include the forehead.  She is unable to fully close the right eye.  She has no other focal deficits.  Symptoms are consistent with Bell's palsy.  At this time I do not feel that any testing is necessary and feels she can be discharged home with valacyclovir and instructions for close follow-up.  She is comfortable with this plan.    Medical Decision Making  Obtained supplemental history:Supplemental history obtained?: No  Reviewed external records: External records reviewed?: No  Care impacted by chronic illness:Documented in Chart  Did you consider but not order tests?: Work up considered but not performed and documented in chart, if applicable  Did you interpret images independently?: Independent interpretation of ECG and images noted in documentation, when applicable.  Consultation discussion with other provider:Did you involve another provider (consultant, MH, pharmacy, etc.)?: No  Discharge. I prescribed additional prescription strength medication(s) as charted. See documentation for any additional details.    MIPS: Not Applicable      At the conclusion of the encounter I discussed the results of all of the tests and the disposition. The questions were answered. The patient or family acknowledged understanding and was agreeable with the care plan.         NEW PRESCRIPTIONS STARTED AT TODAY'S ER VISIT  New Prescriptions    VALACYCLOVIR (VALTREX) 1000 MG TABLET    Take 1 tablet (1,000 mg) by  mouth 3 times daily for 7 days.          =================================================================    HPI        Samy Leal is a 24 year old female who presents to the emergency department today with complaints of right-sided facial weakness.  She states that this started 2 days ago.  She notes that when she is drinking, she often drools from the right side of her mouth.  She is unable to fully close her right eyelid or raise her right eyebrow.  She denies any symptoms in the extremities.  No facial pain.  No other current complaints.    Of note, she is currently 26 weeks pregnant.      PAST MEDICAL HISTORY:  No past medical history on file.    PAST SURGICAL HISTORY:  No past surgical history on file.        CURRENT MEDICATIONS:    cyclobenzaprine (FLEXERIL) 10 MG tablet  lidocaine (LIDODERM) 5 %  valACYclovir (VALTREX) 1000 mg tablet        ALLERGIES:  No Known Allergies    FAMILY HISTORY:  No family history on file.    SOCIAL HISTORY:   Social History     Socioeconomic History    Marital status: Single   Tobacco Use    Smoking status: Passive Smoke Exposure - Never Smoker    Smokeless tobacco: Never    Tobacco comments:     Quit smoking: outside   Substance and Sexual Activity    Drug use: Unknown     Types: Other     Comment: Drug use: Not Asked   Social History Narrative    Preloaded 12/31/2012.     Social Drivers of Health     Financial Resource Strain: Low Risk  (10/23/2023)    Received from [facility]    Financial Resource Strain     Difficulty of Paying Living Expenses: 3   Food Insecurity: No Food Insecurity (10/23/2023)    Received from [facility]    Food Insecurity     Do you worry your food will run out before you are able to buy more?: 1   Transportation Needs: No Transportation Needs (10/23/2023)    Received from [facility]    Transportation Needs     Lack of Transportation (Medical): 1  "  Social Connections: Socially Integrated (10/23/2023)    Received from [facility]    Social Connections     Frequency of Communication with Friends and Family: 0   Housing Stability: Low Risk  (10/23/2023)    Received from [facility]    Housing Stability     What is your housing situation today?: 1       VITALS:  Patient Vitals for the past 24 hrs:   BP Temp Temp src Pulse Resp SpO2 Height Weight   12/10/24 1315 117/66 97.5  F (36.4  C) Oral 114 16 100 % 1.6 m (5' 3\") 67.1 kg (148 lb)       PHYSICAL EXAM    Constitutional:  Well developed, Well nourished,  HENT:  Normocephalic, Atraumatic, Oropharynx moist, Nose normal.   Eyes:  EOMI, Conjunctiva normal, No discharge.   Respiratory:  Normal breath sounds, No respiratory distress, No wheezing  Cardiovascular: Mild tachycardia, Normal rhythm, No murmurs  Musculoskeletal:  No tenderness to palpation or major deformities noted.   Neurologic:  Alert & oriented x 3, weakness of the right face including the forehead.  Inability to fully close the right eyelid.  No other focal deficits appreciated  Psychiatric:  Affect normal, Judgment normal, Mood normal.          Romero Jones DO  Emergency Medicine  [facility] EMERGENCY DEPARTMENT  [facility] [facility] [id number] [phone]  Dept: [phone]     Romero Jones DO  12/10/24 1347    "

## 2024-12-10 NOTE — DISCHARGE INSTRUCTIONS
Your symptoms today appear to be due to Bell's palsy.  Take the prescribed medication as directed and follow-up closely with your primary care doctor.  Return to the emergency department for any worsening symptoms or other concerns.

## 2024-12-10 NOTE — ED TRIAGE NOTES
Patient presents here for evaluation of right facial paralysis that has occurred over the past two days. Unable to control right eye blink, lift her right eye brow and right side of mouth is drooped. No pain to the right side of her face. No known acute illnesses, including fever, cough, vomiting or diarrhea.      Triage Assessment (Adult)       Row Name 12/10/24 1316          Triage Assessment    Airway WDL WDL        Respiratory WDL    Respiratory WDL WDL        Skin Circulation/Temperature WDL    Skin Circulation/Temperature WDL WDL        Cardiac WDL    Cardiac WDL WDL        Peripheral/Neurovascular WDL    Peripheral Neurovascular WDL WDL        Cognitive/Neuro/Behavioral WDL    Cognitive/Neuro/Behavioral WDL WDL

## 2025-01-07 ENCOUNTER — TRANSFERRED RECORDS (OUTPATIENT)
Dept: HEALTH INFORMATION MANAGEMENT | Facility: CLINIC | Age: 25
End: 2025-01-07

## 2025-01-21 ENCOUNTER — OFFICE VISIT (OUTPATIENT)
Dept: MATERNAL FETAL MEDICINE | Facility: HOSPITAL | Age: 25
End: 2025-01-21
Attending: OBSTETRICS & GYNECOLOGY
Payer: COMMERCIAL

## 2025-01-21 ENCOUNTER — ANCILLARY PROCEDURE (OUTPATIENT)
Dept: ULTRASOUND IMAGING | Facility: HOSPITAL | Age: 25
End: 2025-01-21
Attending: OBSTETRICS & GYNECOLOGY
Payer: COMMERCIAL

## 2025-01-21 DIAGNOSIS — O35.EXX0 ENCOUNTER FOR REPEAT ULTRASOUND OF FETAL PYELECTASIS, ANTEPARTUM, SINGLE OR UNSPECIFIED FETUS: Primary | ICD-10-CM

## 2025-01-21 DIAGNOSIS — O35.EXX0 PYELECTASIS OF FETUS ON PRENATAL ULTRASOUND: ICD-10-CM

## 2025-01-21 PROCEDURE — 99207 PR NO CHARGE LOS: CPT | Performed by: OBSTETRICS & GYNECOLOGY

## 2025-01-21 PROCEDURE — 76816 OB US FOLLOW-UP PER FETUS: CPT | Mod: 26 | Performed by: OBSTETRICS & GYNECOLOGY

## 2025-01-21 PROCEDURE — 76816 OB US FOLLOW-UP PER FETUS: CPT

## 2025-01-21 NOTE — NURSING NOTE
Samyhollis Leal is a  at 32w0d who presents to [facility] for scheduled follow up ultrasound of fetal kidneys. Pt reports positive fetal movement. Pt denies bldg/lof/change in discharge, contractions, headache, vision changes, chest pain/SOB or edema. SBAR given to Dr. Amezquita, see note in Epic.

## 2025-01-21 NOTE — PROGRESS NOTES
Please see the imaging tab for details of the ultrasound performed today.    Cheryl Amezquita MD  Specialist in Maternal-Fetal Medicine

## 2025-03-03 ENCOUNTER — APPOINTMENT (OUTPATIENT)
Dept: ULTRASOUND IMAGING | Facility: HOSPITAL | Age: 25
End: 2025-03-03
Attending: EMERGENCY MEDICINE
Payer: COMMERCIAL

## 2025-03-03 VITALS
BODY MASS INDEX: 28.13 KG/M2 | DIASTOLIC BLOOD PRESSURE: 65 MMHG | SYSTOLIC BLOOD PRESSURE: 117 MMHG | HEART RATE: 102 BPM | TEMPERATURE: 98 F | WEIGHT: 158.8 LBS | RESPIRATION RATE: 20 BRPM | OXYGEN SATURATION: 97 %

## 2025-03-03 PROCEDURE — 99281 EMR DPT VST MAYX REQ PHY/QHP: CPT

## 2025-03-03 PROCEDURE — 93971 EXTREMITY STUDY: CPT | Mod: RT

## 2025-03-03 ASSESSMENT — COLUMBIA-SUICIDE SEVERITY RATING SCALE - C-SSRS
6. HAVE YOU EVER DONE ANYTHING, STARTED TO DO ANYTHING, OR PREPARED TO DO ANYTHING TO END YOUR LIFE?: NO
1. IN THE PAST MONTH, HAVE YOU WISHED YOU WERE DEAD OR WISHED YOU COULD GO TO SLEEP AND NOT WAKE UP?: NO
2. HAVE YOU ACTUALLY HAD ANY THOUGHTS OF KILLING YOURSELF IN THE PAST MONTH?: NO

## 2025-03-04 ENCOUNTER — HOSPITAL ENCOUNTER (EMERGENCY)
Facility: HOSPITAL | Age: 25
Discharge: LEFT BEFORE DISCHARGE | End: 2025-03-04
Attending: EMERGENCY MEDICINE | Admitting: EMERGENCY MEDICINE
Payer: COMMERCIAL

## 2025-03-04 NOTE — ED PROVIDER NOTES
NAME: Yuliana Arreola  AGE: 24 year old female  YOB: 2000  MRN: [id number]  EVALUATION DATE & TIME: No admission date for patient encounter.    PCP: No Ref-Primary, Physician    ED PROVIDER: Lamonte New M.D.      Chief Complaint   Patient presents with    Knee Pain    Leg Swelling         FINAL IMPRESSION:  No diagnosis found.    MEDICAL DECISION MAKIN:14 AM I met with the patient, obtained history, performed an initial exam, and discussed options and plan for diagnostics and treatment here in the ED.   Patient was clinically assessed and consented to treatment. After assessment, medical decision making and workup were discussed with the patient. The patient was agreeable to plan for testing, workup, and treatment.  Pertinent Labs & Imaging studies reviewed. (See chart for details)       Medical Decision Making  Obtained supplemental history:Supplemental history obtained?: Documented in chart  Reviewed external records: External records reviewed?: Documented in chart  Care impacted by chronic illness:Documented in Chart and Mental Health  Care significantly affected by social determinants of health:Access to Medical Care  Did you consider but not order tests?: In addition to work-up documented, I considered the following work up:   Did you interpret images independently?: Independent interpretation of ECG and images noted in documentation, when applicable.  Consultation discussion with other provider:{Did you involve another provider (consultant, , pharmacy, etc.)?:[id number]}  {ADMIT VS D/C:557301}  {[facility] DOCUMENTATION:546506}    Yuliana Arreola is a 24 year old female who presents with ***.   Differential diagnosis includes but not limited to ***.  ***    0 minutes of critical care time    MEDICATIONS GIVEN IN THE EMERGENCY:  Medications - No data to display    NEW PRESCRIPTIONS STARTED AT TODAY'S ER VISIT:  New Prescriptions    No medications on file           =================================================================    HPI    Patient information was obtained from: Patient    Use of : N/A       Yuliana Arreola is a 24 year old female with a past medical history of ADHD, who presents with knee pain.    The patient reports ***      REVIEW OF SYSTEMS   Review of Systems     PAST MEDICAL HISTORY:  No past medical history on file.    PAST SURGICAL HISTORY:  No past surgical history on file.    CURRENT MEDICATIONS:    No current facility-administered medications for this encounter.    Current Outpatient Medications:     cyclobenzaprine (FLEXERIL) 10 MG tablet, [CYCLOBENZAPRINE (FLEXERIL) 10 MG TABLET] Take 1 tablet (10 mg total) by mouth 2 (two) times a day as needed for muscle spasms., Disp: 15 tablet, Rfl: 0    lidocaine (LIDODERM) 5 %, [LIDOCAINE (LIDODERM) 5 %] Remove & Discard patch within 12 hours or as directed by MD, Disp: 15 patch, Rfl: 0    ALLERGIES:  No Known Allergies    FAMILY HISTORY:  No family history on file.    SOCIAL HISTORY:   Social History     Socioeconomic History    Marital status: Single   Tobacco Use    Smoking status: Passive Smoke Exposure - Never Smoker    Smokeless tobacco: Never    Tobacco comments:     Quit smoking: outside   Substance and Sexual Activity    Drug use: Unknown     Types: Other     Comment: Drug use: Not Asked   Social History Narrative    Preloaded 12/31/2012.     Social Drivers of Health     Financial Resource Strain: Low Risk  (10/23/2023)    Received from [facility]    Financial Resource Strain     Difficulty of Paying Living Expenses: 3   Food Insecurity: No Food Insecurity (10/23/2023)    Received from [facility]    Food Insecurity     Do you worry your food will run out before you are able to buy more?: 1   Transportation Needs: No Transportation Needs (10/23/2023)    [facility]     Transportation Needs     Lack of Transportation (Medical): 1   Social Connections: Socially Integrated (10/23/2023)    Received from [facility]    Social Connections     Frequency of Communication with Friends and Family: 0   Housing Stability: Low Risk  (10/23/2023)    [facility] from [facility]    Housing Stability     What is your housing situation today?: 1       PHYSICAL EXAM:    Vitals: /65   Pulse 102   Temp 98  F (36.7  C) (Temporal)   Resp 20   Wt 72 kg (158 lb 12.8 oz)   LMP 06/11/2024   SpO2 97%   BMI 28.13 kg/m     Physical Exam      LAB:  All pertinent labs reviewed and interpreted.  Labs Ordered and Resulted from Time of ED Arrival to Time of ED Departure - No data to display    RADIOLOGY:  US Lower Extremity Venous Duplex Right   Final Result   IMPRESSION:   1.  No deep venous thrombosis in the right lower extremity.        PROCEDURES:   Procedures       I, Donnie Narayan, am serving as a scribe to document services personally performed by Dr. Lamonte New  based on my observation and the provider's statements to me. I, Lamonte New MD attest that Donnie Narayan is acting in a scribe capacity, has observed my performance of the services and has documented them in accordance with my direction.      Lamonte New M.D.  Emergency Medicine  [facility] Emergency Department

## 2025-03-04 NOTE — ED TRIAGE NOTES
Pt reports she is 37 weeks gestation, pt reports swelling to RLE and feeling as though her R knee could give out.  Pt denies any other symptoms.       Triage Assessment (Adult)       Row Name 03/03/25 2127          Triage Assessment    Airway WDL WDL        Respiratory WDL    Respiratory WDL WDL        Skin Circulation/Temperature WDL    Skin Circulation/Temperature WDL WDL        Cardiac WDL    Cardiac WDL WDL        Peripheral/Neurovascular WDL    Peripheral Neurovascular WDL WDL        Cognitive/Neuro/Behavioral WDL    Cognitive/Neuro/Behavioral WDL WDL

## 2025-03-11 PROBLEM — Z34.90 TERM PREGNANCY: Status: ACTIVE | Noted: 2025-03-11

## 2025-03-12 ENCOUNTER — HOSPITAL ENCOUNTER (INPATIENT)
Facility: HOSPITAL | Age: 25
End: 2025-03-12
Attending: OBSTETRICS & GYNECOLOGY | Admitting: OBSTETRICS & GYNECOLOGY
Payer: COMMERCIAL

## 2025-03-12 PROCEDURE — 86923 COMPATIBILITY TEST ELECTRIC: CPT | Performed by: ADVANCED PRACTICE MIDWIFE

## 2025-03-13 PROBLEM — Z36.89 ENCOUNTER FOR TRIAGE IN PREGNANT PATIENT: Status: ACTIVE | Noted: 2024-11-06

## 2025-03-13 PROBLEM — Z37.9 NORMAL LABOR: Status: ACTIVE | Noted: 2025-03-13

## 2025-03-13 LAB
ERYTHROCYTE [DISTWIDTH] IN BLOOD BY AUTOMATED COUNT: 15.7 % (ref 10–15)
HCT VFR BLD AUTO: 32 % (ref 35–47)
HGB BLD-MCNC: 9.2 G/DL (ref 11.7–15.7)
MCH RBC QN AUTO: 21.4 PG (ref 26.5–33)
MCHC RBC AUTO-ENTMCNC: 28.8 G/DL (ref 31.5–36.5)
MCV RBC AUTO: 74 FL (ref 78–100)
PLATELET # BLD AUTO: 415 10E3/UL (ref 150–450)
RBC # BLD AUTO: 4.3 10E6/UL (ref 3.8–5.2)
T PALLIDUM AB SER QL: NONREACTIVE
WBC # BLD AUTO: 15.9 10E3/UL (ref 4–11)

## 2025-03-13 PROCEDURE — 250N000013 HC RX MED GY IP 250 OP 250 PS 637

## 2025-03-13 PROCEDURE — 120N000001 HC R&B MED SURG/OB

## 2025-03-13 PROCEDURE — 3E033VJ INTRODUCTION OF OTHER HORMONE INTO PERIPHERAL VEIN, PERCUTANEOUS APPROACH: ICD-10-PCS | Performed by: OBSTETRICS & GYNECOLOGY

## 2025-03-13 PROCEDURE — 3E0R3BZ INTRODUCTION OF ANESTHETIC AGENT INTO SPINAL CANAL, PERCUTANEOUS APPROACH: ICD-10-PCS | Performed by: ANESTHESIOLOGY

## 2025-03-13 PROCEDURE — 250N000011 HC RX IP 250 OP 636

## 2025-03-13 PROCEDURE — 250N000011 HC RX IP 250 OP 636: Performed by: ANESTHESIOLOGY

## 2025-03-13 PROCEDURE — 250N000009 HC RX 250

## 2025-03-13 PROCEDURE — 85014 HEMATOCRIT: CPT

## 2025-03-13 PROCEDURE — 00HU33Z INSERTION OF INFUSION DEVICE INTO SPINAL CANAL, PERCUTANEOUS APPROACH: ICD-10-PCS | Performed by: ANESTHESIOLOGY

## 2025-03-13 PROCEDURE — 250N000011 HC RX IP 250 OP 636: Performed by: ADVANCED PRACTICE MIDWIFE

## 2025-03-13 PROCEDURE — 36415 COLL VENOUS BLD VENIPUNCTURE: CPT

## 2025-03-13 PROCEDURE — 96372 THER/PROPH/DIAG INJ SC/IM: CPT

## 2025-03-13 PROCEDURE — 258N000003 HC RX IP 258 OP 636

## 2025-03-13 PROCEDURE — 86780 TREPONEMA PALLIDUM: CPT

## 2025-03-13 RX ORDER — LOPERAMIDE HYDROCHLORIDE 2 MG/1
2 CAPSULE ORAL
Status: DISCONTINUED | OUTPATIENT
Start: 2025-03-13 | End: 2025-03-15

## 2025-03-13 RX ORDER — FENTANYL/ROPIVACAINE/NS/PF 2MCG/ML-.1
PLASTIC BAG, INJECTION (ML) EPIDURAL
Status: DISCONTINUED | OUTPATIENT
Start: 2025-03-13 | End: 2025-03-15

## 2025-03-13 RX ORDER — LIDOCAINE 40 MG/G
CREAM TOPICAL
Status: DISCONTINUED | OUTPATIENT
Start: 2025-03-13 | End: 2025-03-16 | Stop reason: HOSPADM

## 2025-03-13 RX ORDER — METOCLOPRAMIDE HYDROCHLORIDE 5 MG/ML
10 INJECTION INTRAMUSCULAR; INTRAVENOUS EVERY 6 HOURS PRN
Status: DISCONTINUED | OUTPATIENT
Start: 2025-03-13 | End: 2025-03-16 | Stop reason: HOSPADM

## 2025-03-13 RX ORDER — HYDROXYZINE HYDROCHLORIDE 50 MG/1
100 TABLET, FILM COATED ORAL
Status: DISCONTINUED | OUTPATIENT
Start: 2025-03-13 | End: 2025-03-16 | Stop reason: HOSPADM

## 2025-03-13 RX ORDER — OXYTOCIN/0.9 % SODIUM CHLORIDE 30/500 ML
340 PLASTIC BAG, INJECTION (ML) INTRAVENOUS CONTINUOUS PRN
Status: DISCONTINUED | OUTPATIENT
Start: 2025-03-13 | End: 2025-03-15

## 2025-03-13 RX ORDER — PROCHLORPERAZINE MALEATE 10 MG
10 TABLET ORAL EVERY 6 HOURS PRN
Status: DISCONTINUED | OUTPATIENT
Start: 2025-03-13 | End: 2025-03-16 | Stop reason: HOSPADM

## 2025-03-13 RX ORDER — NALOXONE HYDROCHLORIDE 0.4 MG/ML
0.4 INJECTION, SOLUTION INTRAMUSCULAR; INTRAVENOUS; SUBCUTANEOUS
Status: DISCONTINUED | OUTPATIENT
Start: 2025-03-13 | End: 2025-03-16 | Stop reason: HOSPADM

## 2025-03-13 RX ORDER — NALOXONE HYDROCHLORIDE 0.4 MG/ML
0.2 INJECTION, SOLUTION INTRAMUSCULAR; INTRAVENOUS; SUBCUTANEOUS
Status: DISCONTINUED | OUTPATIENT
Start: 2025-03-13 | End: 2025-03-16 | Stop reason: HOSPADM

## 2025-03-13 RX ORDER — TRANEXAMIC ACID 10 MG/ML
1 INJECTION, SOLUTION INTRAVENOUS EVERY 30 MIN PRN
Status: DISCONTINUED | OUTPATIENT
Start: 2025-03-13 | End: 2025-03-15

## 2025-03-13 RX ORDER — OXYTOCIN 10 [USP'U]/ML
10 INJECTION, SOLUTION INTRAMUSCULAR; INTRAVENOUS
Status: DISCONTINUED | OUTPATIENT
Start: 2025-03-13 | End: 2025-03-15

## 2025-03-13 RX ORDER — HYDROXYZINE HYDROCHLORIDE 50 MG/1
50 TABLET, FILM COATED ORAL EVERY 6 HOURS PRN
Status: DISCONTINUED | OUTPATIENT
Start: 2025-03-13 | End: 2025-03-16 | Stop reason: HOSPADM

## 2025-03-13 RX ORDER — LOPERAMIDE HYDROCHLORIDE 2 MG/1
4 CAPSULE ORAL
Status: COMPLETED | OUTPATIENT
Start: 2025-03-13 | End: 2025-03-14

## 2025-03-13 RX ORDER — TERBUTALINE SULFATE 1 MG/ML
0.25 INJECTION SUBCUTANEOUS
Status: DISCONTINUED | OUTPATIENT
Start: 2025-03-13 | End: 2025-03-16 | Stop reason: HOSPADM

## 2025-03-13 RX ORDER — LIDOCAINE 40 MG/G
CREAM TOPICAL
Status: DISCONTINUED | OUTPATIENT
Start: 2025-03-13 | End: 2025-03-13 | Stop reason: HOSPADM

## 2025-03-13 RX ORDER — ACETAMINOPHEN 325 MG/1
650 TABLET ORAL EVERY 4 HOURS PRN
Status: DISCONTINUED | OUTPATIENT
Start: 2025-03-13 | End: 2025-03-15

## 2025-03-13 RX ORDER — KETOROLAC TROMETHAMINE 15 MG/ML
15 INJECTION, SOLUTION INTRAMUSCULAR; INTRAVENOUS
Status: COMPLETED | OUTPATIENT
Start: 2025-03-13 | End: 2025-03-14

## 2025-03-13 RX ORDER — FENTANYL CITRATE 50 UG/ML
100 INJECTION, SOLUTION INTRAMUSCULAR; INTRAVENOUS
Status: DISCONTINUED | OUTPATIENT
Start: 2025-03-13 | End: 2025-03-15

## 2025-03-13 RX ORDER — MISOPROSTOL 200 UG/1
800 TABLET ORAL
Status: DISCONTINUED | OUTPATIENT
Start: 2025-03-13 | End: 2025-03-16 | Stop reason: HOSPADM

## 2025-03-13 RX ORDER — IBUPROFEN 800 MG/1
800 TABLET, FILM COATED ORAL
Status: COMPLETED | OUTPATIENT
Start: 2025-03-13 | End: 2025-03-14

## 2025-03-13 RX ORDER — METHYLERGONOVINE MALEATE 0.2 MG/ML
200 INJECTION INTRAVENOUS
Status: DISCONTINUED | OUTPATIENT
Start: 2025-03-13 | End: 2025-03-15

## 2025-03-13 RX ORDER — METOCLOPRAMIDE 10 MG/1
10 TABLET ORAL EVERY 6 HOURS PRN
Status: DISCONTINUED | OUTPATIENT
Start: 2025-03-13 | End: 2025-03-16 | Stop reason: HOSPADM

## 2025-03-13 RX ORDER — MORPHINE SULFATE 10 MG/ML
10 INJECTION, SOLUTION INTRAMUSCULAR; INTRAVENOUS
Status: DISCONTINUED | OUTPATIENT
Start: 2025-03-13 | End: 2025-03-15

## 2025-03-13 RX ORDER — SODIUM CHLORIDE, SODIUM LACTATE, POTASSIUM CHLORIDE, CALCIUM CHLORIDE 600; 310; 30; 20 MG/100ML; MG/100ML; MG/100ML; MG/100ML
INJECTION, SOLUTION INTRAVENOUS CONTINUOUS
Status: DISCONTINUED | OUTPATIENT
Start: 2025-03-13 | End: 2025-03-16 | Stop reason: HOSPADM

## 2025-03-13 RX ORDER — OXYTOCIN/0.9 % SODIUM CHLORIDE 30/500 ML
100-340 PLASTIC BAG, INJECTION (ML) INTRAVENOUS CONTINUOUS PRN
Status: DISCONTINUED | OUTPATIENT
Start: 2025-03-13 | End: 2025-03-16 | Stop reason: HOSPADM

## 2025-03-13 RX ORDER — MISOPROSTOL 200 UG/1
400 TABLET ORAL
Status: DISCONTINUED | OUTPATIENT
Start: 2025-03-13 | End: 2025-03-16 | Stop reason: HOSPADM

## 2025-03-13 RX ORDER — FENTANYL CITRATE-0.9 % NACL/PF 10 MCG/ML
100 PLASTIC BAG, INJECTION (ML) INTRAVENOUS EVERY 5 MIN PRN
Status: DISCONTINUED | OUTPATIENT
Start: 2025-03-13 | End: 2025-03-16 | Stop reason: HOSPADM

## 2025-03-13 RX ORDER — SODIUM CHLORIDE, SODIUM LACTATE, POTASSIUM CHLORIDE, CALCIUM CHLORIDE 600; 310; 30; 20 MG/100ML; MG/100ML; MG/100ML; MG/100ML
INJECTION, SOLUTION INTRAVENOUS CONTINUOUS PRN
Status: DISCONTINUED | OUTPATIENT
Start: 2025-03-13 | End: 2025-03-16 | Stop reason: HOSPADM

## 2025-03-13 RX ORDER — FENTANYL CITRATE 50 UG/ML
50 INJECTION, SOLUTION INTRAMUSCULAR; INTRAVENOUS EVERY 30 MIN PRN
Status: DISCONTINUED | OUTPATIENT
Start: 2025-03-13 | End: 2025-03-15

## 2025-03-13 RX ORDER — NALBUPHINE HYDROCHLORIDE 20 MG/ML
2.5-5 INJECTION, SOLUTION INTRAMUSCULAR; INTRAVENOUS; SUBCUTANEOUS EVERY 6 HOURS PRN
Status: DISCONTINUED | OUTPATIENT
Start: 2025-03-13 | End: 2025-03-16 | Stop reason: HOSPADM

## 2025-03-13 RX ORDER — CITRIC ACID/SODIUM CITRATE 334-500MG
30 SOLUTION, ORAL ORAL
Status: DISCONTINUED | OUTPATIENT
Start: 2025-03-13 | End: 2025-03-16 | Stop reason: HOSPADM

## 2025-03-13 RX ORDER — CARBOPROST TROMETHAMINE 250 UG/ML
250 INJECTION, SOLUTION INTRAMUSCULAR
Status: DISCONTINUED | OUTPATIENT
Start: 2025-03-13 | End: 2025-03-16 | Stop reason: HOSPADM

## 2025-03-13 RX ORDER — ONDANSETRON 4 MG/1
4 TABLET, ORALLY DISINTEGRATING ORAL EVERY 6 HOURS PRN
Status: DISCONTINUED | OUTPATIENT
Start: 2025-03-13 | End: 2025-03-16 | Stop reason: HOSPADM

## 2025-03-13 RX ORDER — ONDANSETRON 2 MG/ML
4 INJECTION INTRAMUSCULAR; INTRAVENOUS EVERY 6 HOURS PRN
Status: DISCONTINUED | OUTPATIENT
Start: 2025-03-13 | End: 2025-03-16 | Stop reason: HOSPADM

## 2025-03-13 RX ORDER — OXYCODONE HYDROCHLORIDE 5 MG/1
5 TABLET ORAL
Status: DISCONTINUED | OUTPATIENT
Start: 2025-03-13 | End: 2025-03-16 | Stop reason: HOSPADM

## 2025-03-13 RX ORDER — OXYTOCIN/0.9 % SODIUM CHLORIDE 30/500 ML
1-24 PLASTIC BAG, INJECTION (ML) INTRAVENOUS CONTINUOUS
Status: DISCONTINUED | OUTPATIENT
Start: 2025-03-13 | End: 2025-03-15

## 2025-03-13 RX ADMIN — Medication 2 MILLI-UNITS/MIN: at 16:49

## 2025-03-13 RX ADMIN — HYDROXYZINE HYDROCHLORIDE 100 MG: 50 TABLET, FILM COATED ORAL at 01:07

## 2025-03-13 RX ADMIN — Medication: at 20:05

## 2025-03-13 RX ADMIN — FENTANYL CITRATE 100 MCG: 50 INJECTION, SOLUTION INTRAMUSCULAR; INTRAVENOUS at 11:51

## 2025-03-13 RX ADMIN — MORPHINE SULFATE 10 MG: 10 INJECTION, SOLUTION INTRAMUSCULAR; INTRAVENOUS at 01:08

## 2025-03-13 RX ADMIN — SODIUM CHLORIDE, SODIUM LACTATE, POTASSIUM CHLORIDE, AND CALCIUM CHLORIDE: .6; .31; .03; .02 INJECTION, SOLUTION INTRAVENOUS at 18:17

## 2025-03-13 RX ADMIN — SODIUM CHLORIDE, SODIUM LACTATE, POTASSIUM CHLORIDE, AND CALCIUM CHLORIDE 500 ML: .6; .31; .03; .02 INJECTION, SOLUTION INTRAVENOUS at 14:30

## 2025-03-13 RX ADMIN — Medication: at 14:56

## 2025-03-13 ASSESSMENT — ACTIVITIES OF DAILY LIVING (ADL)
ADLS_ACUITY_SCORE: 19

## 2025-03-13 NOTE — PROVIDER NOTIFICATION
Sarah HUDDLESTONM notified of triage note. Per patient she would like morphine and visatril for sleep. Will plan to recheck cervix when patient wakes up. Ok for patient to be off the monitor while sleeping if reactive NST.

## 2025-03-13 NOTE — H&P
HISTORY AND PHYSICAL UPDATE ADMISSION EXAM    Name: Yuliana Arreola  YOB: 2000  Medical Record Number: [id number]    History of Present Illness: Yuliana Arreola is a 24 year old female who is 39w2d pregnant and being admitted for induction of labor, indication elective. Patient was admitted for elective IOL on 3/11 and discharged home due to slow cervical change with continued need for cervical ripening and hospital being on divert at that time. She returned on 3/12 in the evening with report of painful contractions. She was given therapeutic rest overnight and was not found to make cervical change from 2.5cm when rechecked at 0600 today. When checked by the writer at 1100 she was found to be 4cm. Patient rating contraction pain 8/10 and is unable to get comfortable. On admission, she does note a mild headache which has persisted since about 32 weeks gestation, BP is normotensive. FOB is not involved with the pregnancy, planning for her mother to be support person during labor.    Estimated Date of Delivery: Mar 18, 2025    EGA 39w2d    EFW 44% at 32 weeks    OB History    Para Term  AB Living   1 0 0 0 0 0   SAB IAB Ectopic Multiple Live Births   0 0 0 0 0      # Outcome Date GA Lbr Jeet/2nd Weight Sex Type Anes PTL Lv   1 Current                 Lab Results   Component Value Date    HGB 9.2 (L) 2025       Prenatal Complications:  Rh negative; rhogam at 21w for abd trauma and 29w routine  HSV hx, on daily prophylaxis  Fetus with dilated renal pelvis- resolved at 32 weeks  Anemia- Hgb 8.7, given IV iron on 3/12 while admitted  Chlamydia in pregnancy 10/26; DYANA neg     Exam:      /69   Temp 99  F (37.2  C) (Oral)   Resp 18   LMP 2024   SpO2 100%     Fetal heart Rate Category 1  Contractions irregular q 6-10 minutes    HEENT grossly normal  Lungs Breathing comfortably on room air  ABD gravid, non-tender  EXT:  mild edema, moves freely  Vaginal exam  /  Membranes: intact    Assessment:    at 39w2d  induction of labor, indication elective  NST reactive  BSUS cephalic  Hgb 9.2 on admission      Plan: Admit - see IP orders  Discussed possibility of discharging home in early labor and returning for scheduled induction tomorrow, or remaining for elective induction today. Patient elects to stay for induction today.  Labor induction with Pitocin and AROM as appropriate  Repeat CBC in morning for anemia  Planning epidural for pain management as labor progresses  Anticipate     Prenatal record reviewed.    Dr. Alberto aware of patient status and remains available for consultation and collaboration as needed.      JAYY Wallace CNM      3/13/2025   4:20 PM

## 2025-03-13 NOTE — PROGRESS NOTES
Patient Name:  Yuliana Arreola  :      2000  MRN:      [id number]    Subjective:  Yuliana Arreola is coping well with contractions. Using epidural for pain relief. Good PO fluid intake.   Voiding without issue. She is agreeable to attempting AROM at this time as contractions have slowed.     Objective:  /69   Temp 98.3  F (36.8  C) (Oral)   Resp 18   LMP 2024   SpO2 100%     FHR:Baseline: 120 bpm, Variability: Moderate (6 - 25 bpm), Accelerations: present and Decelerations: Absent    Uterine contractions:TocoFrequency: Every 2-6 minutes, Duration: 40-70 seconds and Intensity: moderate    SVE: 5/70/0    Assessment:     at 39w2d  Latent labor  Category 1 FHTs    Plan:   -Discussed R/B/A of amniotomy. She consents to this. Completed with return of clear fluid and Continue position changes to optimize fetal descent.   -Plan to start IV pitocin in 1 hour after AROM if contractions do not become more regular  -Routine support & management. Encourage position changes, ambulation, rest as desired.  -Anticipate progress and NSVB.   -Reevaluate progress in 4-6 hours or sooner with a change in status.     Dr. Alberto aware of patient status and remains available for consultation and collaboration as needed.    Provider:  JAYY Wallace CNM    Date:  3/13/2025  Time:  4:47 PM

## 2025-03-13 NOTE — PLAN OF CARE
Problem: Adult Inpatient Plan of Care  Goal: Plan of Care Review  Description: The Plan of Care Review/Shift note should be completed every shift.  The Outcome Evaluation is a brief statement about your assessment that the patient is improving, declining, or no change.  This information will be displayed automatically on your shift  note.  Flowsheets (Taken 3/13/2025 7316)  Outcome Evaluation: Epidural placed @ 1456, patient coping through contractions after placement. Pitocin started @ 1649, LR @ 125 mL/hr. FHR category 1 - periods of minimal variability. Lindsey every 2-3.5 minutes at time of note. Encouraged PO intake of clear liquids.  Plan of Care Reviewed With: patient  Overall Patient Progress: improving

## 2025-03-13 NOTE — PROVIDER NOTIFICATION
0855: Dakotah March CNM updated on patient status.     SVE 2.5/60-70%/-2, medium and mid. FHR tracing moderate variability with periods of minimal, 15x15 variables, no decels. Contractions have been irregular - 4 contractions in 45 minutes. Palpating mild-moderate, patient is feeling consistently in her back. They did not wake her up during the night. Pt has scheduled induction tomorrow at 4 pm. Received iron transfusion yesterday for Hgb of 8.7.     0945: Writing RN discussed with patient staying for induction today or coming back for scheduled induction tomorrow at 4 pm. Pt said ' she will decide after breakfast'    0955: KAROM and midwifery student updated in unit of patient status. Will round on patient this morning and discuss risks and benefits of induction

## 2025-03-13 NOTE — PROGRESS NOTES
Data: Patient presented to [facility]: 3/12/2025 11:47 PM.  Reason for maternal/fetal assessment is uterine contractions. Patient reports contractions occurring throughout the day. Patient denies leaking of vaginal fluid/rupture of membranes, abdominal pain, pelvic pressure, nausea, vomiting, headache, visual disturbances, epigastric or RUQ pain, significant edema. Patient reports fetal movement is normal. Patient is a 39w2d . Prenatal record reviewed. Pregnancy has been uncomplicated. Support person is present.     Fetal HR baseline was  , variability is  . Accelerations:  . Decelerations:  . Uterine assessment is   during contractions and   at rest. Cervix 2.5 cm dilated and 60-70% effaced. Fetal station -3. Fetal presentation   per cervical exam. Membranes: intact.    Vital signs wnl. Patient reports pain and is not coping.     Action: Verbal consent for EFM. Triage assessment completed. Patient may meet criteria for early labor discharge.     Response: Patient verbalized understanding of triage assessment. Will contact Sarah Jacobs CNM with assessment and consideration of early labor discharge vs admission.

## 2025-03-14 VITALS
WEIGHT: 156 LBS | TEMPERATURE: 99.2 F | BODY MASS INDEX: 26.63 KG/M2 | SYSTOLIC BLOOD PRESSURE: 112 MMHG | HEIGHT: 64 IN | RESPIRATION RATE: 16 BRPM | DIASTOLIC BLOOD PRESSURE: 55 MMHG | OXYGEN SATURATION: 89 %

## 2025-03-14 PROBLEM — D62 ANEMIA DUE TO BLOOD LOSS, ACUTE: Status: ACTIVE | Noted: 2025-03-14

## 2025-03-14 LAB
ABO + RH BLD: NORMAL
ALBUMIN SERPL BCG-MCNC: 2.5 G/DL (ref 3.5–5.2)
ALP SERPL-CCNC: 122 U/L (ref 40–150)
ALT SERPL W P-5'-P-CCNC: 9 U/L (ref 0–50)
ANION GAP SERPL CALCULATED.3IONS-SCNC: 6 MMOL/L (ref 7–15)
APTT PPP: 24 SECONDS (ref 22–38)
APTT PPP: 28 SECONDS (ref 22–38)
AST SERPL W P-5'-P-CCNC: 26 U/L (ref 0–45)
BASOPHILS # BLD AUTO: 0 10E3/UL (ref 0–0.2)
BASOPHILS # BLD AUTO: 0 10E3/UL (ref 0–0.2)
BASOPHILS NFR BLD AUTO: 0 %
BASOPHILS NFR BLD AUTO: 0 %
BILIRUB SERPL-MCNC: 1 MG/DL
BLD PROD TYP BPU: NORMAL
BLOOD COMPONENT TYPE: NORMAL
BUN SERPL-MCNC: 6.3 MG/DL (ref 6–20)
CALCIUM SERPL-MCNC: 8.6 MG/DL (ref 8.8–10.4)
CHLORIDE SERPL-SCNC: 108 MMOL/L (ref 98–107)
CODING SYSTEM: NORMAL
CREAT SERPL-MCNC: 0.58 MG/DL (ref 0.51–0.95)
CREAT SERPL-MCNC: 0.75 MG/DL (ref 0.51–0.95)
CROSSMATCH: NORMAL
D DIMER PPP FEU-MCNC: 3.43 UG/ML FEU (ref 0–0.5)
EGFRCR SERPLBLD CKD-EPI 2021: >90 ML/MIN/1.73M2
EGFRCR SERPLBLD CKD-EPI 2021: >90 ML/MIN/1.73M2
EOSINOPHIL # BLD AUTO: 0 10E3/UL (ref 0–0.7)
EOSINOPHIL # BLD AUTO: 0.1 10E3/UL (ref 0–0.7)
EOSINOPHIL NFR BLD AUTO: 0 %
EOSINOPHIL NFR BLD AUTO: 0 %
ERYTHROCYTE [DISTWIDTH] IN BLOOD BY AUTOMATED COUNT: 16.8 % (ref 10–15)
ERYTHROCYTE [DISTWIDTH] IN BLOOD BY AUTOMATED COUNT: 17.4 % (ref 10–15)
FETAL CELL SCN BLD-IMP: NORMAL
FIBRINOGEN PPP-MCNC: 420 MG/DL (ref 170–510)
FIBRINOGEN PPP-MCNC: 505 MG/DL (ref 170–510)
GLUCOSE SERPL-MCNC: 93 MG/DL (ref 70–99)
HCO3 SERPL-SCNC: 22 MMOL/L (ref 22–29)
HCT VFR BLD AUTO: 22.5 % (ref 35–47)
HCT VFR BLD AUTO: 28.5 % (ref 35–47)
HGB BLD-MCNC: 7.2 G/DL (ref 11.7–15.7)
HGB BLD-MCNC: 8.5 G/DL (ref 11.7–15.7)
HGB BLD-MCNC: 9.3 G/DL (ref 11.7–15.7)
HOLD SPECIMEN: NORMAL
IMM GRANULOCYTES # BLD: 0.2 10E3/UL
IMM GRANULOCYTES # BLD: 0.2 10E3/UL
IMM GRANULOCYTES NFR BLD: 1 %
IMM GRANULOCYTES NFR BLD: 1 %
INR PPP: 1.08 (ref 0.85–1.15)
INR PPP: 1.14 (ref 0.85–1.15)
ISSUE DATE AND TIME: NORMAL
LYMPHOCYTES # BLD AUTO: 1.8 10E3/UL (ref 0.8–5.3)
LYMPHOCYTES # BLD AUTO: 2.1 10E3/UL (ref 0.8–5.3)
LYMPHOCYTES NFR BLD AUTO: 13 %
LYMPHOCYTES NFR BLD AUTO: 8 %
MCH RBC QN AUTO: 24.1 PG (ref 26.5–33)
MCH RBC QN AUTO: 25.4 PG (ref 26.5–33)
MCHC RBC AUTO-ENTMCNC: 32 G/DL (ref 31.5–36.5)
MCHC RBC AUTO-ENTMCNC: 32.6 G/DL (ref 31.5–36.5)
MCV RBC AUTO: 75 FL (ref 78–100)
MCV RBC AUTO: 78 FL (ref 78–100)
MONOCYTES # BLD AUTO: 1.2 10E3/UL (ref 0–1.3)
MONOCYTES # BLD AUTO: 1.5 10E3/UL (ref 0–1.3)
MONOCYTES NFR BLD AUTO: 6 %
MONOCYTES NFR BLD AUTO: 7 %
NEUTROPHILS # BLD AUTO: 13 10E3/UL (ref 1.6–8.3)
NEUTROPHILS # BLD AUTO: 19.4 10E3/UL (ref 1.6–8.3)
NEUTROPHILS NFR BLD AUTO: 78 %
NEUTROPHILS NFR BLD AUTO: 85 %
NRBC # BLD AUTO: 0 10E3/UL
NRBC # BLD AUTO: 0 10E3/UL
NRBC BLD AUTO-RTO: 0 /100
NRBC BLD AUTO-RTO: 0 /100
PLATELET # BLD AUTO: 236 10E3/UL (ref 150–450)
PLATELET # BLD AUTO: 273 10E3/UL (ref 150–450)
POTASSIUM SERPL-SCNC: 4.2 MMOL/L (ref 3.4–5.3)
PROT SERPL-MCNC: 4.5 G/DL (ref 6.4–8.3)
RBC # BLD AUTO: 2.99 10E6/UL (ref 3.8–5.2)
RBC # BLD AUTO: 3.66 10E6/UL (ref 3.8–5.2)
SODIUM SERPL-SCNC: 136 MMOL/L (ref 135–145)
SPECIMEN EXP DATE BLD: NORMAL
UNIT ABO/RH: NORMAL
UNIT NUMBER: NORMAL
UNIT STATUS: NORMAL
UNIT TYPE ISBT: 9500
WBC # BLD AUTO: 16.6 10E3/UL (ref 4–11)
WBC # BLD AUTO: 22.9 10E3/UL (ref 4–11)

## 2025-03-14 PROCEDURE — 85610 PROTHROMBIN TIME: CPT | Performed by: STUDENT IN AN ORGANIZED HEALTH CARE EDUCATION/TRAINING PROGRAM

## 2025-03-14 PROCEDURE — 722N000001 HC LABOR CARE VAGINAL DELIVERY SINGLE

## 2025-03-14 PROCEDURE — 85730 THROMBOPLASTIN TIME PARTIAL: CPT | Performed by: STUDENT IN AN ORGANIZED HEALTH CARE EDUCATION/TRAINING PROGRAM

## 2025-03-14 PROCEDURE — 82565 ASSAY OF CREATININE: CPT | Performed by: OBSTETRICS & GYNECOLOGY

## 2025-03-14 PROCEDURE — 10D17Z9 MANUAL EXTRACTION OF PRODUCTS OF CONCEPTION, RETAINED, VIA NATURAL OR ARTIFICIAL OPENING: ICD-10-PCS | Performed by: ADVANCED PRACTICE MIDWIFE

## 2025-03-14 PROCEDURE — 86923 COMPATIBILITY TEST ELECTRIC: CPT | Performed by: ADVANCED PRACTICE MIDWIFE

## 2025-03-14 PROCEDURE — 85025 COMPLETE CBC W/AUTO DIFF WBC: CPT

## 2025-03-14 PROCEDURE — 85461 HEMOGLOBIN FETAL: CPT

## 2025-03-14 PROCEDURE — 85004 AUTOMATED DIFF WBC COUNT: CPT | Performed by: STUDENT IN AN ORGANIZED HEALTH CARE EDUCATION/TRAINING PROGRAM

## 2025-03-14 PROCEDURE — 250N000013 HC RX MED GY IP 250 OP 250 PS 637

## 2025-03-14 PROCEDURE — 250N000011 HC RX IP 250 OP 636

## 2025-03-14 PROCEDURE — 36415 COLL VENOUS BLD VENIPUNCTURE: CPT

## 2025-03-14 PROCEDURE — 88307 TISSUE EXAM BY PATHOLOGIST: CPT | Mod: 26 | Performed by: PATHOLOGY

## 2025-03-14 PROCEDURE — 82947 ASSAY GLUCOSE BLOOD QUANT: CPT | Performed by: STUDENT IN AN ORGANIZED HEALTH CARE EDUCATION/TRAINING PROGRAM

## 2025-03-14 PROCEDURE — 85384 FIBRINOGEN ACTIVITY: CPT | Performed by: OBSTETRICS & GYNECOLOGY

## 2025-03-14 PROCEDURE — 250N000013 HC RX MED GY IP 250 OP 250 PS 637: Performed by: ADVANCED PRACTICE MIDWIFE

## 2025-03-14 PROCEDURE — 250N000009 HC RX 250

## 2025-03-14 PROCEDURE — 250N000011 HC RX IP 250 OP 636: Performed by: OBSTETRICS & GYNECOLOGY

## 2025-03-14 PROCEDURE — 84295 ASSAY OF SERUM SODIUM: CPT | Performed by: STUDENT IN AN ORGANIZED HEALTH CARE EDUCATION/TRAINING PROGRAM

## 2025-03-14 PROCEDURE — 0W3R7ZZ CONTROL BLEEDING IN GENITOURINARY TRACT, VIA NATURAL OR ARTIFICIAL OPENING: ICD-10-PCS | Performed by: OBSTETRICS & GYNECOLOGY

## 2025-03-14 PROCEDURE — 272N000758 HC JADA SYSTEM POST-PARTUM HEMORRAGE CONTROL

## 2025-03-14 PROCEDURE — 86900 BLOOD TYPING SEROLOGIC ABO: CPT

## 2025-03-14 PROCEDURE — 258N000003 HC RX IP 258 OP 636: Performed by: OBSTETRICS & GYNECOLOGY

## 2025-03-14 PROCEDURE — 85384 FIBRINOGEN ACTIVITY: CPT | Performed by: STUDENT IN AN ORGANIZED HEALTH CARE EDUCATION/TRAINING PROGRAM

## 2025-03-14 PROCEDURE — 36415 COLL VENOUS BLD VENIPUNCTURE: CPT | Performed by: OBSTETRICS & GYNECOLOGY

## 2025-03-14 PROCEDURE — P9040 RBC LEUKOREDUCED IRRADIATED: HCPCS | Performed by: ADVANCED PRACTICE MIDWIFE

## 2025-03-14 PROCEDURE — 85730 THROMBOPLASTIN TIME PARTIAL: CPT | Performed by: OBSTETRICS & GYNECOLOGY

## 2025-03-14 PROCEDURE — 85610 PROTHROMBIN TIME: CPT | Performed by: OBSTETRICS & GYNECOLOGY

## 2025-03-14 PROCEDURE — 250N000009 HC RX 250: Performed by: ADVANCED PRACTICE MIDWIFE

## 2025-03-14 PROCEDURE — 10907ZC DRAINAGE OF AMNIOTIC FLUID, THERAPEUTIC FROM PRODUCTS OF CONCEPTION, VIA NATURAL OR ARTIFICIAL OPENING: ICD-10-PCS | Performed by: STUDENT IN AN ORGANIZED HEALTH CARE EDUCATION/TRAINING PROGRAM

## 2025-03-14 PROCEDURE — 88307 TISSUE EXAM BY PATHOLOGIST: CPT | Mod: TC | Performed by: OBSTETRICS & GYNECOLOGY

## 2025-03-14 PROCEDURE — 85018 HEMOGLOBIN: CPT

## 2025-03-14 PROCEDURE — 258N000003 HC RX IP 258 OP 636

## 2025-03-14 PROCEDURE — 85018 HEMOGLOBIN: CPT | Performed by: OBSTETRICS & GYNECOLOGY

## 2025-03-14 PROCEDURE — 120N000001 HC R&B MED SURG/OB

## 2025-03-14 PROCEDURE — 85014 HEMATOCRIT: CPT | Performed by: STUDENT IN AN ORGANIZED HEALTH CARE EDUCATION/TRAINING PROGRAM

## 2025-03-14 PROCEDURE — P9016 RBC LEUKOCYTES REDUCED: HCPCS | Performed by: ADVANCED PRACTICE MIDWIFE

## 2025-03-14 PROCEDURE — P9016 RBC LEUKOCYTES REDUCED: HCPCS

## 2025-03-14 PROCEDURE — 0KQM0ZZ REPAIR PERINEUM MUSCLE, OPEN APPROACH: ICD-10-PCS | Performed by: OBSTETRICS & GYNECOLOGY

## 2025-03-14 PROCEDURE — 36415 COLL VENOUS BLD VENIPUNCTURE: CPT | Performed by: STUDENT IN AN ORGANIZED HEALTH CARE EDUCATION/TRAINING PROGRAM

## 2025-03-14 PROCEDURE — 85379 FIBRIN DEGRADATION QUANT: CPT | Performed by: OBSTETRICS & GYNECOLOGY

## 2025-03-14 RX ORDER — CARBOPROST TROMETHAMINE 250 UG/ML
250 INJECTION, SOLUTION INTRAMUSCULAR
Status: DISCONTINUED | OUTPATIENT
Start: 2025-03-14 | End: 2025-03-16 | Stop reason: HOSPADM

## 2025-03-14 RX ORDER — POLYETHYLENE GLYCOL 3350 17 G/17G
17 POWDER, FOR SOLUTION ORAL DAILY PRN
Status: DISCONTINUED | OUTPATIENT
Start: 2025-03-14 | End: 2025-03-16 | Stop reason: HOSPADM

## 2025-03-14 RX ORDER — MISOPROSTOL 200 UG/1
400 TABLET ORAL
Status: DISCONTINUED | OUTPATIENT
Start: 2025-03-14 | End: 2025-03-16 | Stop reason: HOSPADM

## 2025-03-14 RX ORDER — AMOXICILLIN 250 MG
2 CAPSULE ORAL
Status: DISCONTINUED | OUTPATIENT
Start: 2025-03-14 | End: 2025-03-16 | Stop reason: HOSPADM

## 2025-03-14 RX ORDER — OXYCODONE HYDROCHLORIDE 5 MG/1
5 TABLET ORAL EVERY 4 HOURS PRN
Status: DISCONTINUED | OUTPATIENT
Start: 2025-03-14 | End: 2025-03-16 | Stop reason: HOSPADM

## 2025-03-14 RX ORDER — CEFAZOLIN SODIUM/WATER 2 G/20 ML
2 SYRINGE (ML) INTRAVENOUS
Status: DISCONTINUED | OUTPATIENT
Start: 2025-03-14 | End: 2025-03-16 | Stop reason: HOSPADM

## 2025-03-14 RX ORDER — OXYTOCIN 10 [USP'U]/ML
10 INJECTION, SOLUTION INTRAMUSCULAR; INTRAVENOUS
Status: DISCONTINUED | OUTPATIENT
Start: 2025-03-14 | End: 2025-03-16 | Stop reason: HOSPADM

## 2025-03-14 RX ORDER — AMPICILLIN 2 G/1
2 INJECTION, POWDER, FOR SOLUTION INTRAVENOUS EVERY 6 HOURS
Status: COMPLETED | OUTPATIENT
Start: 2025-03-14 | End: 2025-03-14

## 2025-03-14 RX ORDER — FENTANYL CITRATE 50 UG/ML
50 INJECTION, SOLUTION INTRAMUSCULAR; INTRAVENOUS ONCE
Status: DISCONTINUED | OUTPATIENT
Start: 2025-03-14 | End: 2025-03-15

## 2025-03-14 RX ORDER — IBUPROFEN 800 MG/1
800 TABLET, FILM COATED ORAL EVERY 6 HOURS PRN
Status: DISCONTINUED | OUTPATIENT
Start: 2025-03-14 | End: 2025-03-16 | Stop reason: HOSPADM

## 2025-03-14 RX ORDER — HYDROCORTISONE 25 MG/G
CREAM TOPICAL 3 TIMES DAILY PRN
Status: DISCONTINUED | OUTPATIENT
Start: 2025-03-14 | End: 2025-03-16 | Stop reason: HOSPADM

## 2025-03-14 RX ORDER — LOPERAMIDE HYDROCHLORIDE 2 MG/1
4 CAPSULE ORAL
Status: DISCONTINUED | OUTPATIENT
Start: 2025-03-14 | End: 2025-03-16 | Stop reason: HOSPADM

## 2025-03-14 RX ORDER — ACETAMINOPHEN 325 MG/1
650 TABLET ORAL EVERY 4 HOURS PRN
Status: DISCONTINUED | OUTPATIENT
Start: 2025-03-14 | End: 2025-03-16 | Stop reason: HOSPADM

## 2025-03-14 RX ORDER — METHYLERGONOVINE MALEATE 0.2 MG/1
200 TABLET ORAL EVERY 6 HOURS SCHEDULED
Status: COMPLETED | OUTPATIENT
Start: 2025-03-14 | End: 2025-03-15

## 2025-03-14 RX ORDER — MISOPROSTOL 200 UG/1
800 TABLET ORAL
Status: DISCONTINUED | OUTPATIENT
Start: 2025-03-14 | End: 2025-03-16 | Stop reason: HOSPADM

## 2025-03-14 RX ORDER — METHYLERGONOVINE MALEATE 0.2 MG/ML
200 INJECTION INTRAVENOUS
Status: DISCONTINUED | OUTPATIENT
Start: 2025-03-14 | End: 2025-03-16 | Stop reason: HOSPADM

## 2025-03-14 RX ORDER — BISACODYL 10 MG
10 SUPPOSITORY, RECTAL RECTAL DAILY PRN
Status: DISCONTINUED | OUTPATIENT
Start: 2025-03-14 | End: 2025-03-16 | Stop reason: HOSPADM

## 2025-03-14 RX ORDER — OXYTOCIN/0.9 % SODIUM CHLORIDE 30/500 ML
340 PLASTIC BAG, INJECTION (ML) INTRAVENOUS CONTINUOUS PRN
Status: DISCONTINUED | OUTPATIENT
Start: 2025-03-14 | End: 2025-03-16 | Stop reason: HOSPADM

## 2025-03-14 RX ORDER — TRANEXAMIC ACID 10 MG/ML
1 INJECTION, SOLUTION INTRAVENOUS EVERY 30 MIN PRN
Status: DISCONTINUED | OUTPATIENT
Start: 2025-03-14 | End: 2025-03-16 | Stop reason: HOSPADM

## 2025-03-14 RX ORDER — LOPERAMIDE HYDROCHLORIDE 2 MG/1
2 CAPSULE ORAL
Status: DISCONTINUED | OUTPATIENT
Start: 2025-03-14 | End: 2025-03-16 | Stop reason: HOSPADM

## 2025-03-14 RX ORDER — SODIUM CHLORIDE, SODIUM LACTATE, POTASSIUM CHLORIDE, CALCIUM CHLORIDE 600; 310; 30; 20 MG/100ML; MG/100ML; MG/100ML; MG/100ML
INJECTION, SOLUTION INTRAVENOUS CONTINUOUS
Status: DISCONTINUED | OUTPATIENT
Start: 2025-03-14 | End: 2025-03-16 | Stop reason: HOSPADM

## 2025-03-14 RX ADMIN — FENTANYL CITRATE 100 MCG: 50 INJECTION, SOLUTION INTRAMUSCULAR; INTRAVENOUS at 02:14

## 2025-03-14 RX ADMIN — EPSOM SALT: 1 GRANULE ORAL; TOPICAL at 18:19

## 2025-03-14 RX ADMIN — METHYLERGONOVINE 200 MCG: 0.2 TABLET ORAL at 18:20

## 2025-03-14 RX ADMIN — AMPICILLIN SODIUM 2 G: 2 INJECTION, POWDER, FOR SOLUTION INTRAMUSCULAR; INTRAVENOUS at 18:57

## 2025-03-14 RX ADMIN — LOPERAMIDE HYDROCHLORIDE 4 MG: 2 CAPSULE ORAL at 02:38

## 2025-03-14 RX ADMIN — IBUPROFEN 800 MG: 800 TABLET ORAL at 16:28

## 2025-03-14 RX ADMIN — ACETAMINOPHEN 650 MG: 325 TABLET ORAL at 16:10

## 2025-03-14 RX ADMIN — FENTANYL CITRATE 100 MCG: 50 INJECTION, SOLUTION INTRAMUSCULAR; INTRAVENOUS at 02:36

## 2025-03-14 RX ADMIN — ACETAMINOPHEN 650 MG: 325 TABLET ORAL at 11:45

## 2025-03-14 RX ADMIN — GENTAMICIN SULFATE 90 MG: 40 INJECTION, SOLUTION INTRAMUSCULAR; INTRAVENOUS at 12:04

## 2025-03-14 RX ADMIN — AMPICILLIN SODIUM 2 G: 2 INJECTION, POWDER, FOR SOLUTION INTRAMUSCULAR; INTRAVENOUS at 22:08

## 2025-03-14 RX ADMIN — OXYCODONE HYDROCHLORIDE 5 MG: 5 TABLET ORAL at 18:56

## 2025-03-14 RX ADMIN — BENZOCAINE AND LEVOMENTHOL: 200; 5 SPRAY TOPICAL at 18:19

## 2025-03-14 RX ADMIN — GENTAMICIN SULFATE 110 MG: 40 INJECTION, SOLUTION INTRAMUSCULAR; INTRAVENOUS at 04:50

## 2025-03-14 RX ADMIN — AMPICILLIN SODIUM 2 G: 2 INJECTION, POWDER, FOR SOLUTION INTRAMUSCULAR; INTRAVENOUS at 09:26

## 2025-03-14 RX ADMIN — CARBOPROST TROMETHAMINE 250 MCG: 250 INJECTION, SOLUTION INTRAMUSCULAR at 02:35

## 2025-03-14 RX ADMIN — KETOROLAC TROMETHAMINE 15 MG: 15 INJECTION, SOLUTION INTRAMUSCULAR; INTRAVENOUS at 09:22

## 2025-03-14 RX ADMIN — ACETAMINOPHEN 650 MG: 325 TABLET ORAL at 22:09

## 2025-03-14 RX ADMIN — PSYLLIUM HUSK 1 PACKET: 3.4 POWDER ORAL at 09:25

## 2025-03-14 RX ADMIN — GENTAMICIN SULFATE 90 MG: 40 INJECTION, SOLUTION INTRAMUSCULAR; INTRAVENOUS at 20:42

## 2025-03-14 RX ADMIN — SODIUM CHLORIDE, POTASSIUM CHLORIDE, SODIUM LACTATE AND CALCIUM CHLORIDE: 600; 310; 30; 20 INJECTION, SOLUTION INTRAVENOUS at 04:30

## 2025-03-14 RX ADMIN — METHYLERGONOVINE 200 MCG: 0.2 TABLET ORAL at 12:25

## 2025-03-14 RX ADMIN — OXYCODONE HYDROCHLORIDE 5 MG: 5 TABLET ORAL at 12:25

## 2025-03-14 RX ADMIN — Medication 100 MCG: at 03:19

## 2025-03-14 RX ADMIN — METHYLERGONOVINE MALEATE 200 MCG: 0.2 INJECTION INTRAVENOUS at 02:24

## 2025-03-14 RX ADMIN — AMPICILLIN SODIUM 2 G: 2 INJECTION, POWDER, FOR SOLUTION INTRAMUSCULAR; INTRAVENOUS at 04:33

## 2025-03-14 RX ADMIN — SODIUM CHLORIDE, SODIUM LACTATE, POTASSIUM CHLORIDE, AND CALCIUM CHLORIDE: .6; .31; .03; .02 INJECTION, SOLUTION INTRAVENOUS at 22:49

## 2025-03-14 RX ADMIN — HUMAN RHO(D) IMMUNE GLOBULIN 300 MCG: 1500 SOLUTION INTRAMUSCULAR; INTRAVENOUS at 12:01

## 2025-03-14 RX ADMIN — Medication 100 MCG: at 03:11

## 2025-03-14 ASSESSMENT — ACTIVITIES OF DAILY LIVING (ADL)
ADLS_ACUITY_SCORE: 19
ADLS_ACUITY_SCORE: 23
ADLS_ACUITY_SCORE: 19
ADLS_ACUITY_SCORE: 23
ADLS_ACUITY_SCORE: 23
ADLS_ACUITY_SCORE: 19
ADLS_ACUITY_SCORE: 23
ADLS_ACUITY_SCORE: 19

## 2025-03-14 NOTE — PROGRESS NOTES
Procedure note - PPH and uterine inversion    Called in for retained placenta. 25 yo  who just delivered viable male infant vaginally and umbilical cord had avulsed. Epidural was not providing adequate pain relief and she was given IV fentanyl. By the time I arrived in the room, placenta was removed by CNM once pt was more relaxed. She had received IV pitocin, IM methergine due to bleeding. IM Hemabate also given at this time. Examination performed by me due to continued bleeding. PPH diagnosed as EBL was > 1500mL at this point and additional clots evacuated with bimanual exam. Pt was extremely uncomfortable and concern was noted on exam for possible uterine inversion but was unable to perform exam well due to pt discomfort. Anesthesia notified and administered bolus through epidural. Pt was able to relax and uterus was replaced manually. US was used to confirm uterine inversion and appropriate replacement. Due to exam concerns, OB hospitalist was called for second opinion and confirmed replacement of uterus with exam and US as well. Due to postpartum hemorrhage with continued atony, Crys device was placed with 120cc in vaginal balloon and pressure at 80 mm Hg. Her fundus firmed and bleeding slowed. Mcneill catheter placed. The second degree vaginal laceration was repaired without issue using 2-0 vicryl suture. TXA administered. Cytotec rectally administered. 2 units of blood ordered and started. Pt had a second IV placed when hemorrhage was diagnosed. She did enter hemorrhagic / hypovolemic shock just prior to replacement of uterus with heart rate up to 190 and BP in 80s/50s. After completion of procedure and blood products administration, the pt did stabilize and vitals were improving significantly. She was verbally consented for blood transfusion and examination in the OR if required, but she was able to be stabilized appropriately in the delivery room.     Discussion was undertaken with her family and  at  the end of the procedure. All questions answered.     Will keep micaeal and nicole catheter in place for minimum of 8 hours. Repeat CBC at 0800 today. If stable and pt does well after 8 hours, will deflate micaela and remove from low pressure. If stable after 30 minutes, okay to remove at that time.     Endometritis also diagnosed with fever immediately after procedure was completed. Ampicillin 2g q 6 hrs x 4 doses and gent 1.5 mg/kg q 8 hrs x 3 doses.     Total QBL 2673mL    Jayne Alberto MD, FACOG  OBGYN with [facility]  Text pager: [phone]  March 14, 2025 @ 3:43 AM

## 2025-03-14 NOTE — L&D DELIVERY NOTE
Name: Yuliana Arreola  : 2000  MRN: [id number]    PRE DELIVERY DIAGNOSIS  24 year old  at 39w3d      2. Rh negative; rhogam at 21w for abd trauma and 29w routine  3. HSV hx, on daily prophylaxis  4. Fetus with dilated renal pelvis- resolved at 32 weeks  5. Anemia- Hgb 8.7, given IV iron on 3/12 while admitted  6. Chlamydia in pregnancy 10/26; DYANA neg     POST DELIVERY DIAGNOSIS  24 year old  39w3d  Delivery of a viable male infant APGARs of 7 and 9  Umbilical cord avulsion  Partially retained placenta requiring manual removal  Uterine inversion  Third stage postpartum hemorrhage  Hemorrhagic shock  Anemia due to acute blood loss    YOB: 2025  Birth Time: 1:50 AM    Description of procedure:   24 year old  with [facility] and pregnancy complicated by *see pre-delivery diagnoses* presented to L&D with  elective induction of labor .  Her hospital course was uncomplicated. Patient progressed to complete with artificial rupture of membranes and pitocin.     The fetal head delivered in MANNIE position, followed by the anterior shoulder, which delivered easily with gentle downward traction paired with maternal efforts.     Shoulder Dystocia No  Operative Vaginal Delivery No    Infant:  Live, Initially stunned infant was handed to mom.      Nuchal cord No  The umbilical cord was cut without delayed cord clamping due to fetal status. Cord was very thin and fragile near placental insertion site, and was significantly thicker with more Quincy's jelly than normal on end nearest to baby.     GBS: Negative    Labor Details:  Augmentation Yes  Indication: elective  Induction Yes          Indication: elective  ROM: AROM  Fluid Type: Clear  Monitors: External  Labor Analgesia/Anesthesia:Epidural    Placenta:  Umbilical cord avulsed, Dr. Alberto was called to bedside to assist with manual removal of placenta and concern for postpartum hemorrhage. Dakotah March CNM also  presented to bedside to assist with attempting manual removal while waiting for MD. Please see her note for further documentation on manual placental removal.     Placenta required manual removal 3/14/2025  2:22 AM grossly intact with 3 vessel cord.   Placenta Date/Time: 3/14/2025  2:22 AM  Placenta submitted to Pathology: Yes  Cord pH obtained: No   Third stage interventions included: See Dr. Alberto's note for management of hemorrhage and uterine inversion.    Laceration:   2nd degree laceration. It was repaired in the normal fashion under epidural anesthesia using 2-0 vicryl.    Delivery QBL (mL): 2670.    Dr. Alberto remains available for consultation and collaboration as needed.      JAYY Wallace CNM  3/14/2025 3:31 AM

## 2025-03-14 NOTE — PROGRESS NOTES
Consult Note    Consulting Provider: Liliana Blum MD  Requesting Provider: Dr. Alberto    Reason for being called: Urgent obstetric situation requiring immediate assessment and availability for potential intervention-PPHemorrhage stage 3 EBL reported to be 2L  Details:   I was called to the patient s room by the primary team due to urgent needs.  I received a verbal report from the nursing staff regarding the patient s condition, labor progress, fetal  and any evolving concerns.  I performed a face-to-face assessment, evaluating the patient s clinical status and providing real-time recommendations and interventions as needed.  I remained immediately available for intervention and assisted Dr. Alberto with ultrasound imaging, confirming exam findings and Crys placement and uterine stability.Total time (cumulative in 1 day): 30 minutes    Medical Necessity Statement:  My face-to-face presence was necessary to provide immediate evaluation and stabilization requested by the primary Ob. I assessed the patient s condition and made clinical recommendations to ensure patient safety. My immediate availability ensured rapid intervention if needed, aligning with best practices for obstetric care.    Liliana Blum MD

## 2025-03-14 NOTE — PLAN OF CARE
Problem: Adult Inpatient Plan of Care  Goal: Optimal Comfort and Wellbeing  Outcome: Progressing   Goal Outcome Evaluation:      Plan of Care Reviewed With: patient    Overall Patient Progress: improvingOverall Patient Progress: improving    Outcome Evaluation: VSS and afebrile. Patient states feeling tired and would like to rest. Support person, Marisabel, at bedside and taking care of infant. Fundus firm at umbilicus and scant bleeding noted. Crys in place and secured with tape. Plan for crys to stay for 8 hours. Will plan to call CNM at 1100 regarding trial of removal. Mcneill in place. Patient educated not to get out of bed without assistance. First dose of amp and gent given, will plan to continue per orders. All questions and concerns addressed.

## 2025-03-14 NOTE — PROGRESS NOTES
"Vaginal Delivery Postpartum Day 1    Patient Name:  Yuliana Arreola  :      2000  MRN:      [id number]      Assessment:    S/P uterine inversion following manual removal of placenta for cord avulsion  S/P PPH: QBL 2770  Iron deficiency anemia prior to PPH  S/P 2 unit PRBC    Plan:    Will add methergine 200mcg q 6 hours x 4 doses due to trailing membranes with Crys removal  Oxycodone for breakthrough pain  Ibuprofen/tylenol scheduled  Keep nicole in until 7pm and then reassess patient status to determine if it can come out.  Mag packs to swollen perineum  Continue antibx as ordered.  Continue SCD's until patient up out of bed and moving.   Transfuse another 2 units PRBC      Subjective:  Patient lying on bed with SCD's on. Crys in place.There is blood in the tubing only - none in the canister. Suction was discontinued and bulb was deflated 30 minutes prior to me entering room. Fundus is firm and bleeding is scant. Labia are very swollen. Crys removed without difficulty. Medium sized piece of trailing membrane noted with Crys removal. Yuliana is supported by family. She has a flat affect and is understandably exhausted. Discussed leaving nicole in until she has had a nap and had a meal. She is comfortable with the plan to reassess around 7pm. If swelling still significant at 7, may decide to leave in overnight due to the trauma involved if she needs recatheterization.      YOB: 2025  Birth Time: 1:50 AM    Prenatal Complications include:   Rh negative; rhogam at 21w for abd trauma and 29w routine  HSV hx, on daily prophylaxis  Fetus with dilated renal pelvis- resolved at 32 weeks  Anemia- Hgb 8.7, given IV iron on 3/12 while admitted  Chlamydia in pregnancy 10/26; DYANA neg     Objective:  /59 (BP Location: Right arm, Patient Position: Semi-Chavez's, Cuff Size: Adult Regular)   Pulse 88   Temp 99  F (37.2  C) (Oral)   Resp 16   Ht 1.626 m (5' 4\")   Wt 70.8 kg (156 lb)   LMP " 06/11/2024   SpO2 99%   Breastfeeding Unknown   BMI 26.78 kg/m    Patient Vitals for the past 24 hrs:   BP Temp Temp src Pulse Resp SpO2 Height Weight   03/14/25 1236 106/64 98.7  F (37.1  C) Oral -- 16 100 % -- --   03/14/25 1041 128/59 99  F (37.2  C) Oral -- 16 99 % -- --   03/14/25 0821 -- -- -- -- -- 100 % -- --   03/14/25 0816 -- -- -- -- -- 99 % -- --   03/14/25 0811 -- -- -- -- -- 100 % -- --   03/14/25 0806 -- -- -- -- -- 100 % -- --   03/14/25 0801 -- -- -- -- -- 100 % -- --   03/14/25 0756 -- -- -- -- -- 98 % -- --   03/14/25 0751 -- -- -- -- -- 100 % -- --   03/14/25 0746 -- -- -- -- -- 100 % -- --   03/14/25 0741 -- -- -- -- -- 100 % -- --   03/14/25 0736 -- -- -- -- -- 99 % -- --   03/14/25 0731 -- -- -- -- -- 99 % -- --   03/14/25 0726 -- -- -- -- -- 100 % -- --   03/14/25 0721 -- -- -- -- -- 96 % -- --   03/14/25 0716 -- -- -- -- -- 98 % -- --   03/14/25 0712 99/61 -- -- -- -- -- -- --   03/14/25 0711 -- -- -- -- -- 99 % -- --   03/14/25 0706 -- -- -- -- -- 97 % -- --   03/14/25 0701 -- 98  F (36.7  C) Oral -- 17 98 % -- --   03/14/25 0505 100/58 -- -- -- -- -- -- --   03/14/25 0450 102/61 -- -- -- -- -- -- --   03/14/25 0440 -- 99  F (37.2  C) Axillary -- 16 -- -- --   03/14/25 0435 100/59 -- -- -- -- -- -- --   03/14/25 0405 96/56 -- -- -- -- -- -- --   03/14/25 0350 100/64 98.1  F (36.7  C) Oral -- 16 -- -- --   03/14/25 0330 95/65 -- -- -- -- -- -- --   03/14/25 0320 95/59 -- -- -- -- -- -- --   03/14/25 0315 95/59 -- -- -- -- -- -- --   03/14/25 0310 90/54 -- -- -- -- -- -- --   03/14/25 0252 -- (!) 101.7  F (38.7  C) -- (!) 183 20 -- -- --   03/14/25 0200 (!) 149/67 -- -- -- -- -- -- --   03/14/25 0028 -- 99.2  F (37.3  C) Oral -- -- -- -- --   03/14/25 0001 112/55 98  F (36.7  C) Oral -- -- -- -- --   03/13/25 2055 -- 98.1  F (36.7  C) Oral -- -- -- -- --   03/13/25 1955 115/68 97.8  F (36.6  C) Oral -- -- (!) 89 % -- --   03/13/25 1800 116/67 98.3  F (36.8  C) Oral -- 16 100 % 1.626 m (5'  "4\") 70.8 kg (156 lb)   03/13/25 1725 101/62 98.5  F (36.9  C) Oral -- -- 100 % -- --   03/13/25 1656 98/59 -- -- -- -- -- -- --   03/13/25 1625 98/62 -- -- -- -- 98 % -- --   03/13/25 1622 -- 98.3  F (36.8  C) Oral -- -- -- -- --   03/13/25 1554 112/69 -- -- -- -- -- -- --   03/13/25 1549 114/74 -- -- -- -- -- -- --   03/13/25 1543 114/71 -- -- -- -- -- -- --   03/13/25 1539 112/70 -- -- -- -- -- -- --   03/13/25 1534 105/68 -- -- -- -- -- -- --   03/13/25 1523 122/76 99  F (37.2  C) Oral -- 18 100 % -- --   03/13/25 1521 -- -- -- -- -- 100 % -- --   03/13/25 1519 -- -- -- -- -- 100 % -- --   03/13/25 1518 111/67 -- -- -- -- -- -- --   03/13/25 1515 -- -- -- -- -- 100 % -- --   03/13/25 1513 109/62 -- -- -- -- 99 % -- --   03/13/25 1511 -- -- -- -- -- 99 % -- --   03/13/25 1509 -- -- -- -- -- 99 % -- --   03/13/25 1508 107/63 -- -- -- -- -- -- --   03/13/25 1505 -- -- -- -- -- 99 % -- --   03/13/25 1503 -- -- -- -- -- 99 % -- --   03/13/25 1502 112/62 -- -- -- -- -- -- --   03/13/25 1501 -- -- -- -- -- 99 % -- --   03/13/25 1500 129/76 -- -- -- -- -- -- --   03/13/25 1459 -- -- -- -- -- 99 % -- --   03/13/25 1458 103/61 -- -- -- -- -- -- --   03/13/25 1457 -- -- -- -- -- 99 % -- --   03/13/25 1456 113/75 -- -- -- -- -- -- --   03/13/25 1455 -- -- -- -- -- 100 % -- --   03/13/25 1454 125/73 -- -- -- -- -- -- --   03/13/25 1453 -- -- -- -- -- 100 % -- --   03/13/25 1452 120/74 -- -- -- -- -- -- --       Exam: Patient A&O x 3. No acute distress, breathing unlabored.The amount and color of the lochia is appropriate for the duration of recovery. Uterine fundus is firm at U. Very tender fundus. Perineum:  very swollen.       Lab Results   Component Value Date    HGB 7.2 (L) 03/14/2025       Immunization History   Administered Date(s) Administered    COVID-19 Bivalent 12+ (Pfizer) 07/28/2023       Provider:  JAYY Camara CNM    Date:  3/14/2025  Time:  12:07 PM      "

## 2025-03-14 NOTE — PROGRESS NOTES
Called Dr. Alberto to come in for a cord that was avulsing, per request of FELIPA Moore. Also called DALIA Claire bedside per FELIPA Boateng request..Catie Pruitt RN

## 2025-03-14 NOTE — PROGRESS NOTES
Called to the room at 0204 for an avulsed cord with retained placenta after a normal vaginal delivery at 0150. Cord had avulsed with gentle cord traction per DALIA Pepper.  Pt with very poor pain control with epidural in place, moaning, screaming constantly without any external touch.  Dr. Alberto had already been paged and was en route.  QBL at that time was 600, maternal pulse in the 180s; suspected concealed third stage hemorrage.   Ordered RN to not turn off epidural, however cartridge noted to be at 0.   100 mcg was ordered and given with some relief.  30 ml of pitocin was already infusing in 500ml LR.  With sterile gloves I placed two fingers into the vagina and immediately felt the bulk of the placenta.   I hooked the placenta with my two fingers and attempted removal with my left hand supporting her fundus. I was unable to remove the placenta so my hand was inserted and could feel part of the placenta was still within the uterus. I and was able to grasp bulk of placenta and slowly work it out at 0222. Small sweep of vagina with cervix appreciated after removal. No clots or large gushes of bleeding noted.    Total blood loss measured after removal was 1000. Methergine was given IM.  Second stage hemorrhage was announced to the room with labs ordered and second IV. Additional nursing staff was requested.  Red rubber catheter drained bladder with 100 dark yellow urine.  Pt tolerated procedure fair.  Placenta inspected and complete.  Bleeding was stable after placental removal.   Several minutes later, fundus was a little boggy, RN applied fundal massage. Hemabate given.  Dr. Alberto then arrived and assumed care. See her note.  Dakotah March CNM

## 2025-03-14 NOTE — PROGRESS NOTES
Requested by FELIPA boateng to bring in Hemabate and immodium. Meds passed of to RN. Hemorrhage cart placed outside room. FELIPA Boateng requested Fentanyl 100mcg to be brought into room. Med obtained and handed off to FELIPA Boateng. MD requested RN call MDA to come bedside, as pt in pain with manual removal of placenta.   Notified MDA via phone of MD request to come assess. MDA will come see pt.Catie Pruitt RN

## 2025-03-14 NOTE — PROGRESS NOTES
"Postpartum Rounding Note    Hospital Summary:   Patient is a 23yo now P1 who was a/f elective induction. Induction proceeded with pitocin and AROM and she progressed to complete. She delivered 3/14 at 0150; delivery was complicated by placental cord avulsion s/p manual removal, uterine inversion, PPH with QBL 2670, with placement of Crys which remains in place. Course also c/b endometritis which was diagnosed immediately postpartum is on amp/gent x24h. She received 2 units pRBCs.     Prenatal Complications:  Rh negative; rhogam at 21w for abd trauma and 29w routine  HSV hx, on daily prophylaxis  Fetus with dilated renal pelvis- resolved at 32 weeks  Anemia- Hgb 8.7, given IV iron on 3/12 while admitted  Chlamydia in pregnancy 10/26; DYANA neg     Subjective:   Patient cramping pain and lower back pain. Otherwise no complaints. Denies dizziness/lightheadedness.     Objective:   BP 99/61   Pulse (!) 183   Temp 98  F (36.7  C) (Oral)   Resp 17   Ht 1.626 m (5' 4\")   Wt 70.8 kg (156 lb)   LMP 2024   SpO2 100%   Breastfeeding Unknown   BMI 26.78 kg/m    Gen: well appearing  Abd: fundus firm and 2cm below umbilicus, uterus nontender, abdomen soft and nonperitoneal  Ext: mild edema b/l  Pelvic: no bleeding around crys, labia mildly edematous    Hgb 9.2 pre delivery > post partum pending     A/P: 23yo now P1 PPD0 s/p  c/b placental cord avulsion s/p manual removal, uterine inversion, PPH with QBL 2670, with placement of Crys which remains in place, and endometritis.   - Postpartum hemorrhage: s/p 2u pRBCs. Remains hemodynamically stable. Preop hgb 9.2, repeat labs pending now, plan on q4 x1 then daily. Crys remains in place, plan to deflate at 11am. Did receive IV iron for pre delivery anemia, second dose in 24 hours  - Endometritis: receiving amp/gent x24 hours   - DVT ppx: SCDs  - Routine postpartum care     Andrea Jovel MD  8:32 AM  25    "

## 2025-03-14 NOTE — PROGRESS NOTES
2nd unit of blood infused. Attempted to flush IV and vein blew. Pt wincing in pain with attempt to flush. IV removed. Tip intact.Catie Pruitt RN

## 2025-03-15 LAB
BASOPHILS # BLD AUTO: 0.1 10E3/UL (ref 0–0.2)
BASOPHILS NFR BLD AUTO: 0 %
EOSINOPHIL # BLD AUTO: 0.1 10E3/UL (ref 0–0.7)
EOSINOPHIL NFR BLD AUTO: 1 %
ERYTHROCYTE [DISTWIDTH] IN BLOOD BY AUTOMATED COUNT: 17.4 % (ref 10–15)
HCT VFR BLD AUTO: 27 % (ref 35–47)
HGB BLD-MCNC: 8.7 G/DL (ref 11.7–15.7)
HOLD SPECIMEN: NORMAL
HOLD SPECIMEN: NORMAL
IMM GRANULOCYTES # BLD: 0.2 10E3/UL
IMM GRANULOCYTES NFR BLD: 1 %
LYMPHOCYTES # BLD AUTO: 2 10E3/UL (ref 0.8–5.3)
LYMPHOCYTES NFR BLD AUTO: 14 %
MCH RBC QN AUTO: 25.4 PG (ref 26.5–33)
MCHC RBC AUTO-ENTMCNC: 32.2 G/DL (ref 31.5–36.5)
MCV RBC AUTO: 79 FL (ref 78–100)
MONOCYTES # BLD AUTO: 1.2 10E3/UL (ref 0–1.3)
MONOCYTES NFR BLD AUTO: 8 %
NEUTROPHILS # BLD AUTO: 11 10E3/UL (ref 1.6–8.3)
NEUTROPHILS NFR BLD AUTO: 76 %
NRBC # BLD AUTO: 0 10E3/UL
NRBC BLD AUTO-RTO: 0 /100
PLATELET # BLD AUTO: 217 10E3/UL (ref 150–450)
RBC # BLD AUTO: 3.43 10E6/UL (ref 3.8–5.2)
WBC # BLD AUTO: 14.6 10E3/UL (ref 4–11)

## 2025-03-15 PROCEDURE — 120N000001 HC R&B MED SURG/OB

## 2025-03-15 PROCEDURE — 250N000013 HC RX MED GY IP 250 OP 250 PS 637

## 2025-03-15 PROCEDURE — 85041 AUTOMATED RBC COUNT: CPT | Performed by: STUDENT IN AN ORGANIZED HEALTH CARE EDUCATION/TRAINING PROGRAM

## 2025-03-15 PROCEDURE — 36415 COLL VENOUS BLD VENIPUNCTURE: CPT | Performed by: STUDENT IN AN ORGANIZED HEALTH CARE EDUCATION/TRAINING PROGRAM

## 2025-03-15 PROCEDURE — 258N000003 HC RX IP 258 OP 636

## 2025-03-15 PROCEDURE — 85004 AUTOMATED DIFF WBC COUNT: CPT | Performed by: STUDENT IN AN ORGANIZED HEALTH CARE EDUCATION/TRAINING PROGRAM

## 2025-03-15 PROCEDURE — 250N000011 HC RX IP 250 OP 636

## 2025-03-15 PROCEDURE — 85018 HEMOGLOBIN: CPT | Performed by: STUDENT IN AN ORGANIZED HEALTH CARE EDUCATION/TRAINING PROGRAM

## 2025-03-15 PROCEDURE — 250N000013 HC RX MED GY IP 250 OP 250 PS 637: Performed by: ADVANCED PRACTICE MIDWIFE

## 2025-03-15 RX ORDER — ALBUTEROL SULFATE 0.83 MG/ML
2.5 SOLUTION RESPIRATORY (INHALATION)
Status: DISCONTINUED | OUTPATIENT
Start: 2025-03-15 | End: 2025-03-16 | Stop reason: HOSPADM

## 2025-03-15 RX ORDER — ALBUTEROL SULFATE 90 UG/1
1-2 INHALANT RESPIRATORY (INHALATION)
Status: DISCONTINUED | OUTPATIENT
Start: 2025-03-15 | End: 2025-03-16 | Stop reason: HOSPADM

## 2025-03-15 RX ORDER — MEPERIDINE HYDROCHLORIDE 25 MG/ML
25 INJECTION INTRAMUSCULAR; INTRAVENOUS; SUBCUTANEOUS
Status: DISCONTINUED | OUTPATIENT
Start: 2025-03-15 | End: 2025-03-16 | Stop reason: HOSPADM

## 2025-03-15 RX ORDER — DIPHENHYDRAMINE HYDROCHLORIDE 50 MG/ML
25 INJECTION, SOLUTION INTRAMUSCULAR; INTRAVENOUS
Status: DISCONTINUED | OUTPATIENT
Start: 2025-03-15 | End: 2025-03-16 | Stop reason: HOSPADM

## 2025-03-15 RX ORDER — DIPHENHYDRAMINE HYDROCHLORIDE 50 MG/ML
50 INJECTION, SOLUTION INTRAMUSCULAR; INTRAVENOUS
Status: DISCONTINUED | OUTPATIENT
Start: 2025-03-15 | End: 2025-03-16 | Stop reason: HOSPADM

## 2025-03-15 RX ORDER — METHYLPREDNISOLONE SODIUM SUCCINATE 40 MG/ML
40 INJECTION INTRAMUSCULAR; INTRAVENOUS
Status: DISCONTINUED | OUTPATIENT
Start: 2025-03-15 | End: 2025-03-16 | Stop reason: HOSPADM

## 2025-03-15 RX ADMIN — METHYLERGONOVINE 200 MCG: 0.2 TABLET ORAL at 06:39

## 2025-03-15 RX ADMIN — ACETAMINOPHEN 650 MG: 325 TABLET ORAL at 18:37

## 2025-03-15 RX ADMIN — IBUPROFEN 800 MG: 800 TABLET ORAL at 14:31

## 2025-03-15 RX ADMIN — IBUPROFEN 800 MG: 800 TABLET ORAL at 22:28

## 2025-03-15 RX ADMIN — METHYLERGONOVINE 200 MCG: 0.2 TABLET ORAL at 00:29

## 2025-03-15 RX ADMIN — IRON SUCROSE 300 MG: 20 INJECTION, SOLUTION INTRAVENOUS at 10:45

## 2025-03-15 RX ADMIN — IBUPROFEN 800 MG: 800 TABLET ORAL at 00:29

## 2025-03-15 RX ADMIN — ACETAMINOPHEN 650 MG: 325 TABLET ORAL at 14:31

## 2025-03-15 RX ADMIN — ACETAMINOPHEN 650 MG: 325 TABLET ORAL at 07:56

## 2025-03-15 RX ADMIN — IBUPROFEN 800 MG: 800 TABLET ORAL at 06:39

## 2025-03-15 ASSESSMENT — ACTIVITIES OF DAILY LIVING (ADL)
ADLS_ACUITY_SCORE: 24

## 2025-03-15 NOTE — PROGRESS NOTES
Vaginal Delivery Postpartum Day 1    Patient Name:  Yuliana Arreola  :      2000  MRN:      [id number]      Assessment:   S/P uterine inversion following manual removal of placenta for cord avulsion  S/P PPH: QBL 2770, Crys in place x 8 hrs, removed 11:35 AM yesterday   Chronic iron deficiency anemia   Admission hgb 9.2  Acute blood loss anemia  Admit 9.2 --> 7.2 after delivery and 2 units PRBC --> 9.3 following additional 2 units PRBC --> 8.7 this AM  Hemodynamically stable  S/P 4 unit PRBC  S/P PO methergine x 4 doses  S/P IV antibiotics x 24 hours for treatment of endometritis diagnosed immediately postpartum     Plan:    Continue current care, consider discharge tomorrow.   Oxycodone for breakthrough pain  Ibuprofen/tylenol scheduled  IV Venofer daily until discharge  Discussed trying to stand at bedside today, with plan to try walking to bathroom if well-tolerated. Remove Mcneill when tolerating ambulation. Will consider additional blood products if symptomatic with activity.   Mag packs and ice for perineal swelling  Continue to monitor for symptoms of infection  Continue SCD's until patient up out of bed and moving.   Educated on PPH warning signs and alerting RN or calling clinic after discharge if concern for additional retained POC or heavy bleeding.   Discussed emotional impact of obstetrical emergency/hemorrhage- reviewed monitoring mood closely and calling clinic if she feels depressed or anxious. Debriefed on events of immediate postpartum period to ensure understanding and answer questions.     Subjective:  The patient feels a little better today. Denies dizziness and SOB while in bed- has not ambulated yet. Mcneill in place. Lochia normal, tolerating normal diet. Pain is well controlled with current medications. Reports no current emotional concerns. The baby is well and being fed by both breast and bottle.      YOB: 2025  Birth Time: 1:50 AM    Prenatal Complications include:   Rh  "negative; Rhogam at 21w for abdominal trauma and 29w routine  HSV hx, on daily prophylaxis  Fetus with dilated renal pelvis- resolved at 32 weeks  Anemia- Hgb 8.7, given IV iron on 3/12 while admitted  Chlamydia in pregnancy 10/26; DYANA neg 11/20       Objective:  /69 (BP Location: Left arm)   Pulse 72   Temp 97.4  F (36.3  C) (Oral)   Resp 18   Ht 1.626 m (5' 4\")   Wt 70.8 kg (156 lb)   LMP 06/11/2024   SpO2 98%   Breastfeeding Unknown   BMI 26.78 kg/m    Patient Vitals for the past 24 hrs:   BP Temp Temp src Pulse Resp SpO2   03/15/25 0744 103/69 97.4  F (36.3  C) Oral 72 18 98 %   03/15/25 0104 96/57 97.5  F (36.4  C) Oral 73 16 97 %   03/14/25 2050 103/57 98.1  F (36.7  C) Oral 94 16 96 %   03/14/25 1831 107/62 98.6  F (37  C) Oral 81 18 100 %   03/14/25 1804 102/61 98.6  F (37  C) Axillary 80 16 98 %   03/14/25 1714 100/55 98.6  F (37  C) Axillary 79 19 98 %   03/14/25 1659 104/55 98.6  F (37  C) Axillary 75 16 98 %   03/14/25 1644 103/56 98.2  F (36.8  C) Oral 74 19 98 %   03/14/25 1629 108/54 97.9  F (36.6  C) Oral 72 16 98 %   03/14/25 1614 101/62 98.2  F (36.8  C) Oral 85 14 97 %   03/14/25 1600 105/55 98.7  F (37.1  C) Oral 84 16 97 %   03/14/25 1501 95/53 98.1  F (36.7  C) Oral 81 16 98 %   03/14/25 1446 107/59 98.6  F (37  C) Oral 90 16 97 %   03/14/25 1431 119/55 98.9  F (37.2  C) Oral 97 15 98 %   03/14/25 1416 107/58 98.7  F (37.1  C) Oral 97 16 98 %   03/14/25 1401 108/60 98.7  F (37.1  C) Oral 83 16 98 %   03/14/25 1236 106/64 98.7  F (37.1  C) Oral -- 16 100 %   03/14/25 1041 128/59 99  F (37.2  C) Oral -- 16 99 %       Exam: Patient A&O x 3. No acute distress, breathing unlabored.The amount and color of the lochia is appropriate for the duration of recovery. Nursing notes reviewed. Uterine fundus is firm at U-1. Perineum:  significant edema, soft, no signs of hematoma.       Lab Results   Component Value Date    HGB 8.7 (L) 03/15/2025       Immunization History   Administered Date(s) " Administered    COVID-19 Bivalent 12+ (Pfizer) 07/28/2023       Provider:  JAYY Munoz CNM    Date:  3/15/2025  Time:  8:44 AM

## 2025-03-15 NOTE — PROGRESS NOTES
Report given to PADMINI Hager RN. Patient care relinquished at this time.     Kimberly Hanson RN

## 2025-03-15 NOTE — PLAN OF CARE
Goal Outcome Evaluation:      Plan of Care Reviewed With: patient    Overall Patient Progress: improvingOverall Patient Progress: improving     Problem: Adult Inpatient Plan of Care  Goal: Plan of Care Review  Outcome: Progressing  Flowsheets (Taken 3/14/2025 1957)  Plan of Care Reviewed With: patient  Overall Patient Progress: improving       Crys removed this morning at 1135 by Joselyn Berg CNM. Lochia has been light since. No clots noted.   Hemoglobin of 7.2. Joselyn Berg CNM notified and ordered 2 units of RBCs. Patient received both unit this afternoon and tolerated well. Vital signs stable throughout. No signs of transfusion reaction.   Nicole in place. Joselyn Berg ordered to keep nicole in place until patient rounded on 3/15/25 morning.   Receiving scheduled antibiotics due to manual sweep after delivery.   Patient able to bend ankles, knees, and lift buttocks off bed. Reports feeling exhausted and weak.   Significant perineum swelling noted. Reports perineum and back pain. Pain managed with Ibuprofen, Tylenol, and Oxycodone. Mag packs and ice packs utilized for perineum.   Reviewed plan of care with patient.    home

## 2025-03-15 NOTE — PROGRESS NOTES
SULAIMAN Berg CNM call to writer for status update on patient. Update given, per CNM leave nicole in until rounding CNM is on floor to see her and they will remove it then. RN to continue closely monitoring.     Kimberly Hanson RN

## 2025-03-15 NOTE — PLAN OF CARE
Problem: Adult Inpatient Plan of Care  Goal: Plan of Care Review  Outcome: Progressing  Flowsheets (Taken 3/15/2025 0006)  Outcome Evaluation: Vitals WNL, afebrile. Upon assessment patient was eating some dinner and starting to feel some of energy returning. Patient continues to report feeling weak and overall unable to confidently care for the infant on her own at this time. No one at bedside at this time, per patient she is unsure if anyone will be coming to stay tonight. Crys removed on previous shift. Mcneill remains in place until AM due to perineal swelling and patient fatigue, KAlejandro Berg CNM aware and patient communication order placed by previous RN. IV Ampicillin and Gentamycin given per orders. Patient taking q6h oral Methergine. She is taking Tylenol, Ibuprofen and Oxycodone for discomfort. Fundus firm, 1 below U, scant bleeding noted. Perinuem extremely swollen, angelo care completed. New pad, ice pack, tucks, Dermoplast and Mag packs applied. Patient tolerated well and verbalizes improvment with mobility. SCDs in place. Patient knows to call RN for any concerns and to not get out of bed at all. RN  to continue closely monitoring.  Plan of Care Reviewed With: patient  Overall Patient Progress: improving     Problem: Adult Inpatient Plan of Care  Goal: Patient-Specific Goal (Individualized)  Outcome: Progressing     Problem: Postpartum (Vaginal Delivery)  Goal: Hemostasis  Outcome: Progressing     Problem: Postpartum (Vaginal Delivery)  Goal: Absence of Infection Signs and Symptoms  Outcome: Progressing     Problem: Postpartum (Vaginal Delivery)  Goal: Optimal Pain Control and Function  Outcome: Progressing     Problem: Postpartum (Vaginal Delivery)  Goal: Effective Urinary Elimination  3/15/2025 0015 by Kimberly Hanson, RN  Outcome: Not Progressing    Mcneill in place at this time.

## 2025-03-15 NOTE — PLAN OF CARE
Problem: Adult Inpatient Plan of Care  Goal: Optimal Comfort and Wellbeing  Outcome: Progressing  Intervention: Monitor Pain and Promote Comfort  Recent Flowsheet Documentation  Taken 3/15/2025 1530 by Mounika Cordero, RN  Pain Management Interventions:   care clustered   medication offered but refused   relaxation techniques promoted   repositioned  Intervention: Provide Person-Centered Care  Recent Flowsheet Documentation  Taken 3/15/2025 1530 by Mounika Cordero, RN  Trust Relationship/Rapport:   care explained   choices provided   emotional support provided   empathic listening provided   questions answered   questions encouraged   reassurance provided   thoughts/feelings acknowledged   Goal Outcome Evaluation:      Plan of Care Reviewed With: patient    Overall Patient Progress: improvingOverall Patient Progress: improving    Outcome Evaluation: Patient's VS and assessments WDL. She is still feeling week and a little unsteady on her feet. She did void a second time after nicole removed. RN was assist of 1 up to the bathroom and perineum care. Patient did report that she is feeling a little better and moving better than she did earlier in the day. Encouraged patient to start pumping if she still wants to breastfeed, she stated that she wanted to do it later. Plan to continue to increase activity as tolerated and encourage her to do more cares for her self and baby as tolerated.

## 2025-03-15 NOTE — PLAN OF CARE
Goal Outcome Evaluation:      Plan of Care Reviewed With: patient    Overall Patient Progress: improvingOverall Patient Progress: improving     Problem: Adult Inpatient Plan of Care  Goal: Plan of Care Review  Outcome: Progressing  Flowsheets (Taken 3/15/2025 1211)  Plan of Care Reviewed With: patient  Overall Patient Progress: improving     Problem: Postpartum (Vaginal Delivery)  Goal: Effective Urinary Elimination  Outcome: Progressing     Problem: Postpartum (Vaginal Delivery)  Goal: Optimal Pain Control and Function  Outcome: Progressing     Vital signs stable.   Fundus firm. Lochia WDL. No clots noted.   Patient able to ambulate around room with assistance of one. Mcneill removed at 1100. Encouraged oral fluids as tolerated. Able to void 650mL at 1415. Second void required to pass voiding trails.   Reports perineum and back pain. Pain managed with Ibuprofen and Tylenol. Utilizing Tucks, Dermoplast, Mag packs, and ice packs as needed. Encouraged to take sitz bath later today.  Attentive to  cues. Formula feeding . Encouraged to utilize hospital grade breast pump. Holding and speaking positively of .   Reviewed plan of care with patient.

## 2025-03-15 NOTE — PROVIDER NOTIFICATION
Joselyn Berg CNM at patient bedside. CNM removed Crys. Fundus firm. Light bleeding and trailing membranes noted with Crys removal. CNM ordered to keep nicole in until 7pm and then reassess. Will continue to monitor.

## 2025-03-15 NOTE — PROVIDER NOTIFICATION
Joselyn Berg CNM updated on patient status. Crys in place. Bleeding has been minimal. Fundus firm. CNM orders to deflate Crys, turn off suction, and then reassess in 30 minutes.

## 2025-03-16 VITALS
TEMPERATURE: 98.1 F | RESPIRATION RATE: 15 BRPM | WEIGHT: 151.1 LBS | HEART RATE: 97 BPM | BODY MASS INDEX: 25.8 KG/M2 | DIASTOLIC BLOOD PRESSURE: 57 MMHG | HEIGHT: 64 IN | SYSTOLIC BLOOD PRESSURE: 110 MMHG | OXYGEN SATURATION: 99 %

## 2025-03-16 LAB
BASOPHILS # BLD AUTO: 0 10E3/UL (ref 0–0.2)
BASOPHILS NFR BLD AUTO: 0 %
EOSINOPHIL # BLD AUTO: 0.1 10E3/UL (ref 0–0.7)
EOSINOPHIL NFR BLD AUTO: 1 %
ERYTHROCYTE [DISTWIDTH] IN BLOOD BY AUTOMATED COUNT: 19.4 % (ref 10–15)
HCT VFR BLD AUTO: 30.7 % (ref 35–47)
HGB BLD-MCNC: 9.6 G/DL (ref 11.7–15.7)
HOLD SPECIMEN: NORMAL
HOLD SPECIMEN: NORMAL
IMM GRANULOCYTES # BLD: 0.2 10E3/UL
IMM GRANULOCYTES NFR BLD: 2 %
LYMPHOCYTES # BLD AUTO: 1.6 10E3/UL (ref 0.8–5.3)
LYMPHOCYTES NFR BLD AUTO: 15 %
MCH RBC QN AUTO: 25.3 PG (ref 26.5–33)
MCHC RBC AUTO-ENTMCNC: 31.3 G/DL (ref 31.5–36.5)
MCV RBC AUTO: 81 FL (ref 78–100)
MONOCYTES # BLD AUTO: 0.7 10E3/UL (ref 0–1.3)
MONOCYTES NFR BLD AUTO: 6 %
NEUTROPHILS # BLD AUTO: 7.8 10E3/UL (ref 1.6–8.3)
NEUTROPHILS NFR BLD AUTO: 75 %
NRBC # BLD AUTO: 0 10E3/UL
NRBC BLD AUTO-RTO: 0 /100
PLATELET # BLD AUTO: 271 10E3/UL (ref 150–450)
RBC # BLD AUTO: 3.8 10E6/UL (ref 3.8–5.2)
WBC # BLD AUTO: 10.5 10E3/UL (ref 4–11)

## 2025-03-16 PROCEDURE — 85004 AUTOMATED DIFF WBC COUNT: CPT | Performed by: STUDENT IN AN ORGANIZED HEALTH CARE EDUCATION/TRAINING PROGRAM

## 2025-03-16 PROCEDURE — 250N000009 HC RX 250: Performed by: ADVANCED PRACTICE MIDWIFE

## 2025-03-16 PROCEDURE — 250N000013 HC RX MED GY IP 250 OP 250 PS 637: Performed by: ADVANCED PRACTICE MIDWIFE

## 2025-03-16 PROCEDURE — 258N000003 HC RX IP 258 OP 636

## 2025-03-16 PROCEDURE — 85041 AUTOMATED RBC COUNT: CPT | Performed by: STUDENT IN AN ORGANIZED HEALTH CARE EDUCATION/TRAINING PROGRAM

## 2025-03-16 PROCEDURE — 250N000011 HC RX IP 250 OP 636

## 2025-03-16 PROCEDURE — 85014 HEMATOCRIT: CPT | Performed by: STUDENT IN AN ORGANIZED HEALTH CARE EDUCATION/TRAINING PROGRAM

## 2025-03-16 PROCEDURE — 250N000013 HC RX MED GY IP 250 OP 250 PS 637

## 2025-03-16 PROCEDURE — 36415 COLL VENOUS BLD VENIPUNCTURE: CPT | Performed by: STUDENT IN AN ORGANIZED HEALTH CARE EDUCATION/TRAINING PROGRAM

## 2025-03-16 RX ORDER — ACETAMINOPHEN 325 MG/1
650 TABLET ORAL EVERY 4 HOURS PRN
COMMUNITY
Start: 2025-03-16

## 2025-03-16 RX ORDER — IBUPROFEN 800 MG/1
800 TABLET, FILM COATED ORAL EVERY 6 HOURS PRN
COMMUNITY
Start: 2025-03-16

## 2025-03-16 RX ORDER — AMOXICILLIN 250 MG
2 CAPSULE ORAL
COMMUNITY
Start: 2025-03-16

## 2025-03-16 RX ADMIN — IBUPROFEN 800 MG: 800 TABLET ORAL at 04:26

## 2025-03-16 RX ADMIN — ACETAMINOPHEN 650 MG: 325 TABLET ORAL at 12:30

## 2025-03-16 RX ADMIN — EPSOM SALT 1 BOTTLE: 1 GRANULE ORAL; TOPICAL at 11:16

## 2025-03-16 RX ADMIN — IBUPROFEN 800 MG: 800 TABLET ORAL at 10:08

## 2025-03-16 RX ADMIN — IRON SUCROSE 300 MG: 20 INJECTION, SOLUTION INTRAVENOUS at 09:49

## 2025-03-16 RX ADMIN — ACETAMINOPHEN 650 MG: 325 TABLET ORAL at 04:26

## 2025-03-16 RX ADMIN — ACETAMINOPHEN 650 MG: 325 TABLET ORAL at 08:02

## 2025-03-16 ASSESSMENT — ACTIVITIES OF DAILY LIVING (ADL)
ADLS_ACUITY_SCORE: 27

## 2025-03-16 NOTE — PLAN OF CARE
Goal Outcome Evaluation:      Plan of Care Reviewed With: patient    Overall Patient Progress: improvingOverall Patient Progress: improving    VSS. Up ad keila, continues to feel some weakness and fatigue, but improving. Bottle feeding formula per parental request, tolerating well. Scant amount of lochia, no clots noted. Tylenol and Ibuprofen for pain control. BS audible/active x4, tolerating PO, denies N/V. Voiding. Encouraged patient to work on paperwork. Bonding well with infant, attentive to cares. Mother of patient not at the bedside overnight, but supportive when here. Continue with plan of care.

## 2025-03-16 NOTE — PLAN OF CARE
Problem: Adult Inpatient Plan of Care  Goal: Plan of Care Review  Description: The Plan of Care Review/Shift note should be completed every shift.  The Outcome Evaluation is a brief statement about your assessment that the patient is improving, declining, or no change.  This information will be displayed automatically on your shift  note.  Outcome: Adequate for Care Transition  Flowsheets (Taken 3/16/2025 8006)  Outcome Evaluation:   stable postpartum status and vital signs   IV access removed following infusion of IV iron. Yuliana reporting she feels ready to go  home. Reviewed discharge AVS, including danger signs, medications, follow up appointments  Plan of Care Reviewed With: patient  Overall Patient Progress: improving   Goal Outcome Evaluation:      Plan of Care Reviewed With: patient    Overall Patient Progress: improvingOverall Patient Progress: improving    Outcome Evaluation: stable postpartum status and vital signs; IV access removed following infusion of IV iron. Yuliana reporting she feels ready to go  home. Reviewed discharge AVS, including danger signs, medications, follow up appointments

## 2025-03-16 NOTE — PLAN OF CARE
Goal Outcome Evaluation:      Plan of Care Reviewed With: patient    Overall Patient Progress: improvingOverall Patient Progress: improving     Problem: Adult Inpatient Plan of Care  Goal: Plan of Care Review  Outcome: Progressing  Flowsheets (Taken 3/16/2025 1153)  Plan of Care Reviewed With: patient  Overall Patient Progress: improving     Progressing well. Vital signs stable.   Fundus firm. Lochia WDL. No clots noted.   Voiding spontaneously without difficultly.   Passing gas. Had first bowel movement overnight.   Hemoglobin of 9.6 this morning. Received iron sucrose this morning. Tolerated well. Vital signs stable throughout. No signs of infusion reaction.   Ambulating in room independently.   Reports perineum pain and discomfort. Pain managed with Ibuprofen and Tylenol.   Utilizing angelo bottle, Tucks, Dermoplast, mag packs, and ice as needed. Encouraged to take sitz bath at home.   Attentive to  cues. Formula feeding, changing diapers, and speaking positively of .   Anticipate discharge today 3/16/25.   Reviewed plan of care with patient.

## 2025-03-16 NOTE — LACTATION NOTE
This note was copied from a baby's chart.  Mom declined support with latch-   encouraged pumping to support       Follow up Lactation Visit    Current age : 2 days  old    Gestational age at delivery: 39w3d     Diaper count (last 24 hours) : 5 wet 1 soiled     Feedings (last 24 hours): 0 breast 10 Formula 20k/pushpa 5-41mL    Weight Loss: 2.4% at 51 hours    Breastfeeding goals: breast + formula    Past breastfeeding experience: none first baby     Maternal health risk that may affect breastfeeding: QBL >1000    Home Pump: Spectra     Feeding assessment: declined support with latching     Visit Summary: reviewed the importance of stimulating milk production to support full milk supply.         Feeding plan:   Continue to feed infant formula, pump each feeding to support full milk supply.        Education:   [] [place] Feeding Patterns in the First Few Days/second Night     [] Maternal Risk Factors that Could Affect Milk Supply      [] Hand Expression   [] Stages of Milk Production           [] Feeding Cues           [] LPI Feeding Behaviors  [] LBW Feeding Behaviors    [] Rousing Techniques          [] Benefits of Skin to Skin               [] Breastfeeding Positions          [] Tips to Maintain a Deep Latch               [] Nutritive vs Non-Nutritive Sucking           [] Gentle Breast Compressions  []  Weight Loss   [] How to Know When Baby is Getting Enough to Eat  [] Supplementation Methods        [] Type of Supplement   [] Nipple Shield  [] Sore Nipples        [] Pacifier Use  [] Tight Frenulum           [] Breastfeeding Twins  [x] Pumping Plan  [] Breastpump Education   [] Engorgement/Reverse Pressure Softening           [] Inpatient Lactation Support      [] Outpatient Lactation Resources/Follow up    Follow up: LC will continue to follow up during hospital stay. Encouraged follow up with OP LC next week

## 2025-03-16 NOTE — PROGRESS NOTES
D:  Patient desires discharge home.  Discharge orders received and entered by provider.  A:  Discharge instructions reviewed with the patient.  All questions and concerns addressed.  R:  Discharge criteria met.  4 Part ID bands double checked.   discharged in car seat with mother.  The nursing assistant escorted patient to car .

## 2025-03-16 NOTE — PROGRESS NOTES
"Vaginal Delivery Postpartum Day 1    Patient Name:  Yuliana Arreola  :      2000  MRN:      [id number]        Vaginal Delivery Postpartum Day 1     Patient Name:  Yuliana Arreola  :                 2000  MRN:                 [id number]        Assessment:   S/P uterine inversion following manual removal of placenta for cord avulsion  S/P PPH: QBL 2770, Crys in place x 8 hrs, removed 11:35 AM yesterday   Chronic iron deficiency anemia   Admission hgb 9.2  Acute blood loss anemia  Admit 9.2 --> 7.2 after delivery and 2 units PRBC --> 9.3 following additional 2 units PRBC --> 9.6 this AM  Hemodynamically stable  S/P 4 unit PRBC  S/P PO methergine x 4 doses  S/P IV antibiotics x 24 hours for treatment of endometritis diagnosed immediately postpartum          Plan:  Continue current care and discharge to home.      Subjective:  The patient feels like she is getting better.  Voiding without difficulty, lochia normal, tolerating normal diet, ambulating without difficulty and passing flatus. Moving slow but ambulating without assistance. Voiding independently and having some incontinence prior to sitting down on the toilet. Pain is well controlled with current medications.  The patient is processing the events, states she does not remember much of what happened. States she has good support with her mother and aunt. Lives with her mom. The baby is well and being fed by bottle.      YOB: 2025  Birth Time: 1:50 AM    Prenatal Complications include:   Rh negative; Rhogam at 21w for abdominal trauma and 29w routine  HSV hx, on daily prophylaxis  Fetus with dilated renal pelvis- resolved at 32 weeks  Anemia- Hgb 8.7, given IV iron on 3/12 while admitted  Chlamydia in pregnancy 10/26; DYANA neg     Objective:  /61 (BP Location: Right arm)   Pulse 97   Temp 98  F (36.7  C) (Oral)   Resp 20   Ht 1.626 m (5' 4\")   Wt 68.5 kg (151 lb 1.6 oz)   LMP 2024   SpO2 99%   Breastfeeding " Unknown   BMI 25.94 kg/m    Patient Vitals for the past 24 hrs:   BP Temp Temp src Pulse Resp SpO2 Weight   03/16/25 1056 104/61 98  F (36.7  C) Oral 97 20 99 % --   03/16/25 1035 107/62 98.2  F (36.8  C) Oral 103 17 100 % --   03/16/25 1020 109/61 98  F (36.7  C) Oral 99 18 99 % --   03/16/25 1005 104/62 97.7  F (36.5  C) Oral 88 17 99 % --   03/16/25 0950 105/58 98.3  F (36.8  C) Oral 90 17 98 % --   03/16/25 0945 108/65 98.2  F (36.8  C) Oral 96 18 99 % --   03/16/25 0912 -- -- -- -- -- -- 68.5 kg (151 lb 1.6 oz)   03/16/25 0752 104/59 98  F (36.7  C) Oral 85 17 98 % --   03/15/25 2343 90/44 97.8  F (36.6  C) Oral -- 18 97 % --   03/15/25 1530 93/56 97.6  F (36.4  C) Oral 96 16 -- --   03/15/25 1304 98/60 98  F (36.7  C) Axillary 75 19 98 % --   03/15/25 1249 94/56 98  F (36.7  C) Axillary 68 18 98 % --   03/15/25 1145 91/45 98.3  F (36.8  C) Oral 85 16 98 % --   03/15/25 1130 94/49 98.3  F (36.8  C) Oral 73 16 97 % --       Exam: Patient A&O x 3. No acute distress, breathing unlabored.The amount and color of the lochia is appropriate for the duration of recovery. Uterine fundus is firm and vulvar edema is still present per RN.     Lab Results   Component Value Date    HGB 9.6 (L) 03/16/2025       Immunization History   Administered Date(s) Administered    COVID-19 Bivalent 12+ (Pfizer) 07/28/2023    Td (Adult), Adsorbed 04/23/2024       Provider:  Dakotah March CNM    Date:  3/16/2025  Time:  11:23 AM

## 2025-03-16 NOTE — DISCHARGE SUMMARY
OB Discharge Summary      Date:  3/16/2025    Name:  Yuliana Arreola  :  2000  MRN:  [id number]      Admission Date:  3/12/2025  Delivery Date: 3/14/2025  Gestational Age at Delivery:  39w3d  Discharge Date:  3/16/2025    Principal Diagnosis:    Patient Active Problem List   Diagnosis    Encounter for triage in pregnant patient    Term pregnancy    Normal labor    Postpartum hemorrhage    Anemia due to blood loss, acute    Inversion of uterus, delivered with postpartum complication         Conditions complicating Pregnancy:   Rh negative; Rhogam at 21w for abdominal trauma and 29w routine  HSV hx, on daily prophylaxis  Fetus with dilated renal pelvis- resolved at 32 weeks  Anemia- Hgb 8.7, given IV iron on 3/12 while admitted  Chlamydia in pregnancy 10/26; DYANA neg     Procedure(s) Performed:  NSVB, removal of partially retained placenta, Crys placement    Indication for :    Indication for Induction:  elective     Condition at Discharge:  stable with anemia    Discharge Medications:      Review of your medicines        START taking        Dose / Directions   ibuprofen 800 MG tablet  Commonly known as: ADVIL/MOTRIN  Used for: Postpartum care and examination of lactating mother      Dose: 800 mg  Take 1 tablet (800 mg) by mouth every 6 hours as needed for other (first line or per patient preference for mild to moderate pain management).  Refills: 0     senna-docusate 8.6-50 MG tablet  Commonly known as: SENOKOT-S/PERICOLACE  Used for: Postpartum care and examination of lactating mother      Dose: 2 tablet  Take 2 tablets by mouth nightly as needed for constipation.  Refills: 0            CONTINUE these medicines which may have CHANGED, or have new prescriptions. If we are uncertain of the size of tablets/capsules you have at home, strength may be listed as something that might have changed.        Dose / Directions   acetaminophen 325 MG tablet  Commonly known as: TYLENOL  This may have changed:  reasons to take this  Used for: Postpartum care and examination of lactating mother      Dose: 650 mg  Take 2 tablets (650 mg) by mouth every 4 hours as needed for fever or other (second line or per patient preference for mild to moderate pain management).  Refills: 0            STOP taking      cyclobenzaprine 10 MG tablet  Commonly known as: FLEXERIL        valACYclovir 500 MG tablet  Commonly known as: VALTREX                  Where to get your medicines        Some of these will need a paper prescription and others can be bought over the counter. Ask your nurse if you have questions.    You don't need a prescription for these medications  acetaminophen 325 MG tablet  ibuprofen 800 MG tablet  senna-docusate 8.6-50 MG tablet          Discharge Plan:    Follow up with /DALIA:  See MD in clinic within one week for overall follow up and then again at 6 weeks and PRN.   Patient Instructions:      Physical activity: As tolerated. Nothing in the vagina for 6 weeks.      Diet:  regular    Medication: see OTC meds.     Other:  Take oral iron daily and we will recheck hgb at 6 weeks PP      Physician/KAROM: Dakotah March CNM    Name:  Yuliana Arreola  :  2000  MRN:  [id number]

## 2025-03-16 NOTE — PLAN OF CARE
Data: Vital signs within normal limits. Postpartum checks within normal limits - see flow record. Patient eating and drinking normally. Patient able to empty bladder independently and is up ambulating. No apparent signs of infection.  Laceration  healing well. Patient performing self cares and is able to care for infant.  Action: Patient medicated during the shift for pain and cramping. See MAR. Patient reassessed within 1 hour after each medication and pain was improved - patient stated she was comfortable. Patient education done about good fluid intake, rest and good pain control. See flow record.  Response: Positive attachment behaviors observed with infant. Support persons Mother Marisabel present.   Plan: Anticipate discharge on 03/16/2025.    Goal Outcome Evaluation:      Plan of Care Reviewed With: patient    Overall Patient Progress: improvingOverall Patient Progress: improving

## 2025-03-17 LAB
PATH REPORT.COMMENTS IMP SPEC: NORMAL
PATH REPORT.COMMENTS IMP SPEC: NORMAL
PATH REPORT.FINAL DX SPEC: NORMAL
PATH REPORT.GROSS SPEC: NORMAL
PATH REPORT.MICROSCOPIC SPEC OTHER STN: NORMAL
PATH REPORT.RELEVANT HX SPEC: NORMAL
PHOTO IMAGE: NORMAL

## 2025-03-18 ENCOUNTER — PATIENT OUTREACH (OUTPATIENT)
Dept: CARE COORDINATION | Facility: CLINIC | Age: 25
End: 2025-03-18
Payer: COMMERCIAL

## 2025-03-18 NOTE — PROGRESS NOTES
[facility]:   [facility] Contact  [facility]/Voicemail     Clinical Data: Post-Discharge Outreach     Outreach attempted x 2.  Left message on patient's voicemail, providing [facility]'s central phone number of [phone] ([phone]) for questions/concerns and/or to schedule an appt with an [facility] provider, if they do not have a PCP.      Plan:  General acute hospital will do no further outreaches at this time.       Sherice Szymanski  Community Health Worker  General acute hospital, [facility]  [phone]     *Connected Care Resource Team does NOT follow patient ongoing. Referrals are identified based on internal discharge reports and the outreach is to ensure patient has an understanding of their discharge instructions.

## 2025-05-31 ENCOUNTER — HEALTH MAINTENANCE LETTER (OUTPATIENT)
Age: 25
End: 2025-05-31